# Patient Record
Sex: FEMALE | URBAN - METROPOLITAN AREA
[De-identification: names, ages, dates, MRNs, and addresses within clinical notes are randomized per-mention and may not be internally consistent; named-entity substitution may affect disease eponyms.]

---

## 2017-09-08 ENCOUNTER — HOSPITAL ENCOUNTER (OUTPATIENT)
Age: 72
Setting detail: SPECIMEN
Discharge: HOME | End: 2017-09-08
Payer: COMMERCIAL

## 2017-09-13 LAB — SURGICAL PATHOLOGY REPORT: NORMAL

## 2018-05-18 ENCOUNTER — HOSPITAL ENCOUNTER (OUTPATIENT)
Age: 73
Setting detail: SPECIMEN
Discharge: HOME | End: 2018-05-18
Payer: COMMERCIAL

## 2018-05-23 LAB — SURGICAL PATHOLOGY REPORT: NORMAL

## 2019-09-09 ENCOUNTER — HOSPITAL ENCOUNTER (OUTPATIENT)
Age: 74
Setting detail: SPECIMEN
Discharge: HOME | End: 2019-09-09
Payer: COMMERCIAL

## 2019-09-13 LAB — SURGICAL PATHOLOGY REPORT: NORMAL

## 2020-12-08 ENCOUNTER — TELEPHONE (OUTPATIENT)
Dept: PRIMARY CARE CLINIC | Age: 75
End: 2020-12-08

## 2020-12-09 RX ORDER — TIZANIDINE 2 MG/1
2 TABLET ORAL 3 TIMES DAILY PRN
Refills: 3 | COMMUNITY
Start: 2020-10-21 | End: 2020-12-09 | Stop reason: SDUPTHER

## 2020-12-09 RX ORDER — TIZANIDINE 2 MG/1
2 TABLET ORAL 3 TIMES DAILY PRN
Qty: 90 TABLET | Refills: 1 | Status: SHIPPED | OUTPATIENT
Start: 2020-12-09 | End: 2021-06-01

## 2021-02-18 RX ORDER — DULOXETIN HYDROCHLORIDE 30 MG/1
CAPSULE, DELAYED RELEASE ORAL
COMMUNITY
Start: 2020-11-17 | End: 2021-02-18 | Stop reason: SDUPTHER

## 2021-02-19 RX ORDER — DULOXETIN HYDROCHLORIDE 30 MG/1
30 CAPSULE, DELAYED RELEASE ORAL 2 TIMES DAILY
Qty: 180 CAPSULE | Refills: 3 | Status: SHIPPED | OUTPATIENT
Start: 2021-02-19 | End: 2021-10-14 | Stop reason: SDUPTHER

## 2021-04-13 RX ORDER — SPIRONOLACTONE 100 MG/1
TABLET, FILM COATED ORAL
COMMUNITY
Start: 2021-01-14 | End: 2021-04-13 | Stop reason: SDUPTHER

## 2021-04-14 RX ORDER — SPIRONOLACTONE 100 MG/1
100 TABLET, FILM COATED ORAL DAILY
Qty: 90 TABLET | Refills: 0 | Status: SHIPPED | OUTPATIENT
Start: 2021-04-14 | End: 2021-06-28

## 2021-04-26 ENCOUNTER — NURSE TRIAGE (OUTPATIENT)
Dept: OTHER | Facility: CLINIC | Age: 76
End: 2021-04-26

## 2021-04-26 NOTE — TELEPHONE ENCOUNTER
Reason for Disposition   SEVERE sinus pain    Answer Assessment - Initial Assessment Questions  1. LOCATION: \"Where does it hurt? \"       Sinuses    2. ONSET: \"When did the sinus pain start? \"  (e.g., hours, days)       5 days    3. SEVERITY: \"How bad is the pain? \"   (Scale 1-10; mild, moderate or severe)    - MILD (1-3): doesn't interfere with normal activities     - MODERATE (4-7): interferes with normal activities (e.g., work or school) or awakens from sleep    - SEVERE (8-10): excruciating pain and patient unable to do any normal activities         Moderate    4. RECURRENT SYMPTOM: \"Have you ever had sinus problems before? \" If so, ask: \"When was the last time? \" and \"What happened that time? \"       NA    5. NASAL CONGESTION: \"Is the nose blocked? \" If so, ask, \"Can you open it or must you breathe through the mouth? \"      Denies    6. NASAL DISCHARGE: \"Do you have discharge from your nose? \" If so ask, \"What color? \"      Green mucous    7. FEVER: \"Do you have a fever? \" If so, ask: \"What is it, how was it measured, and when did it start? \"       Temp of 100.2    8. OTHER SYMPTOMS: \"Do you have any other symptoms? \" (e.g., sore throat, cough, earache, difficulty breathing)      Had bottom teeth removed last Tuesday. In pain from that    9. PREGNANCY: \"Is there any chance you are pregnant? \" \"When was your last menstrual period? \"      NA    Protocols used: SINUS PAIN OR CONGESTION-ADULT-[place]    Received call from [facility] at pre-service center [facility]) [place] with [employer] Complaint. Brief description of triage: Probable sinus infection. Sinus pain and pressure with green mucous. Triage indicates for patient to be seen today. Caller is asking PCP to call in an antibiotic to [address] [place] [address] in [facility]. If caller needs a visit to office please advise patient. Caller does report she has a new patient appointment set up for 5/3.      Care advice provided, patient verbalizes understanding; denies any other questions or concerns; instructed to call back for any new or worsening symptoms. Attention Provider: Thank you for allowing me to participate in the care of your patient. The patient was connected to triage in response to information provided to the ECC. Please do not respond through this encounter as the response is not directed to a shared pool.

## 2021-04-26 NOTE — TELEPHONE ENCOUNTER
Talked to patient, She stated she has all of her bottom teeth pulled last week. She called them and they sent in a antibiotic for her because they were thinking it could be due to a infection in her mouth.

## 2021-06-01 RX ORDER — TIZANIDINE 2 MG/1
TABLET ORAL
Qty: 90 TABLET | Refills: 0 | Status: SHIPPED | OUTPATIENT
Start: 2021-06-01 | End: 2021-06-01 | Stop reason: SDUPTHER

## 2021-06-01 RX ORDER — ATENOLOL 50 MG/1
1 TABLET ORAL DAILY
COMMUNITY
Start: 2021-02-27 | End: 2021-06-01 | Stop reason: SDUPTHER

## 2021-06-02 RX ORDER — TIZANIDINE 2 MG/1
TABLET ORAL
Qty: 90 TABLET | Refills: 0 | Status: SHIPPED | OUTPATIENT
Start: 2021-06-02 | End: 2021-08-23

## 2021-06-02 RX ORDER — ATENOLOL 50 MG/1
50 TABLET ORAL DAILY
Qty: 30 TABLET | Refills: 0 | Status: SHIPPED | OUTPATIENT
Start: 2021-06-02 | End: 2021-07-06

## 2021-06-11 ENCOUNTER — NURSE TRIAGE (OUTPATIENT)
Dept: OTHER | Facility: CLINIC | Age: 76
End: 2021-06-11

## 2021-06-11 NOTE — TELEPHONE ENCOUNTER
Caller dropped during transfer from [facility] ([facility]). 1 attempt to reach call made to mobile number. Staff left a message that I will call again. Answer Assessment - Initial Assessment Questions  1. REASON FOR CALL or QUESTION: \"What is your reason for calling today? \" or \"How can I best  help you? \" or \"What question do you have that I can help answer? \"      Call dropped during transfer from ECC agent. Staff will try calling again    2. CALLER: Document the source of call. (e.g., laboratory, patient).       [facility]    Protocols used: PCP CALL - NO TRIAGE-ADULT-

## 2021-06-11 NOTE — TELEPHONE ENCOUNTER
Caller reached on second attempt. She stated that she was trying to cancel her appointment on Today, 6/11/21 at 1200. Staff called the [address] [place] [address] to report the request and contact the PCP to cancel this appointment.

## 2021-06-21 ENCOUNTER — TELEPHONE (OUTPATIENT)
Dept: PRIMARY CARE CLINIC | Age: 76
End: 2021-06-21

## 2021-06-21 NOTE — TELEPHONE ENCOUNTER
----- Message from Angel Wallis sent at 6/21/2021  9:15 AM EDT -----  Subject: Refill Request    QUESTIONS  Name of Medication? Other - medrol dose pack  Patient-reported dosage and instructions? Takes when she has hurt her knee  How many days do you have left? 0  Preferred Pharmacy? [facility] phone number (if available)? [phone]  Additional Information for Provider? PT needs the Medrol Dose Pack to to   treat knee pain after injury. ---------------------------------------------------------------------------  --------------  Moe LEUNG  What is the best way for the office to contact you? OK to leave message on   voicemail  Preferred Call Back Phone Number?  [phone]

## 2021-06-22 ENCOUNTER — OFFICE VISIT (OUTPATIENT)
Dept: PRIMARY CARE CLINIC | Age: 76
End: 2021-06-22
Payer: COMMERCIAL

## 2021-06-22 VITALS
DIASTOLIC BLOOD PRESSURE: 80 MMHG | HEIGHT: 59 IN | BODY MASS INDEX: 34.07 KG/M2 | WEIGHT: 169 LBS | HEART RATE: 65 BPM | OXYGEN SATURATION: 98 % | SYSTOLIC BLOOD PRESSURE: 130 MMHG

## 2021-06-22 DIAGNOSIS — M81.0 AGE-RELATED OSTEOPOROSIS WITHOUT CURRENT PATHOLOGICAL FRACTURE: ICD-10-CM

## 2021-06-22 DIAGNOSIS — Z12.31 BREAST CANCER SCREENING BY MAMMOGRAM: Primary | ICD-10-CM

## 2021-06-22 DIAGNOSIS — M25.562 ACUTE PAIN OF LEFT KNEE: ICD-10-CM

## 2021-06-22 PROCEDURE — 99213 OFFICE O/P EST LOW 20 MIN: CPT | Performed by: FAMILY MEDICINE

## 2021-06-22 RX ORDER — TRAMADOL HYDROCHLORIDE 50 MG/1
50 TABLET ORAL EVERY 6 HOURS PRN
COMMUNITY

## 2021-06-22 RX ORDER — METHYLPREDNISOLONE 4 MG/1
TABLET ORAL
Qty: 1 KIT | Refills: 1 | Status: SHIPPED | OUTPATIENT
Start: 2021-06-22 | End: 2021-06-28

## 2021-06-22 RX ORDER — DICYCLOMINE HYDROCHLORIDE 10 MG/1
10 CAPSULE ORAL
COMMUNITY
End: 2021-07-21 | Stop reason: SDUPTHER

## 2021-06-22 RX ORDER — LEVOTHYROXINE SODIUM 0.12 MG/1
125 TABLET ORAL DAILY
COMMUNITY
End: 2021-09-17 | Stop reason: SDUPTHER

## 2021-06-22 RX ORDER — TRAZODONE HYDROCHLORIDE 100 MG/1
100 TABLET ORAL NIGHTLY
COMMUNITY
End: 2022-01-13 | Stop reason: SDUPTHER

## 2021-06-22 SDOH — ECONOMIC STABILITY: FOOD INSECURITY: WITHIN THE PAST 12 MONTHS, YOU WORRIED THAT YOUR FOOD WOULD RUN OUT BEFORE YOU GOT MONEY TO BUY MORE.: NEVER TRUE

## 2021-06-22 SDOH — ECONOMIC STABILITY: FOOD INSECURITY: WITHIN THE PAST 12 MONTHS, THE FOOD YOU BOUGHT JUST DIDN'T LAST AND YOU DIDN'T HAVE MONEY TO GET MORE.: NEVER TRUE

## 2021-06-22 ASSESSMENT — SOCIAL DETERMINANTS OF HEALTH (SDOH): HOW HARD IS IT FOR YOU TO PAY FOR THE VERY BASICS LIKE FOOD, HOUSING, MEDICAL CARE, AND HEATING?: NOT HARD AT ALL

## 2021-06-22 ASSESSMENT — PATIENT HEALTH QUESTIONNAIRE - PHQ9
SUM OF ALL RESPONSES TO PHQ9 QUESTIONS 1 & 2: 0
1. LITTLE INTEREST OR PLEASURE IN DOING THINGS: 0
2. FEELING DOWN, DEPRESSED OR HOPELESS: 0
SUM OF ALL RESPONSES TO PHQ QUESTIONS 1-9: 0

## 2021-06-22 NOTE — PROGRESS NOTES
Subjective:      Patient ID: Rashi Edge is a 78 y.o. female. Got tangled up in changing bed covers and twisted knee  Happens once or twice a year, has a replacement and ortho has evaluated  A dose berenice works      Review of Systems   Constitutional: Negative. Musculoskeletal: Positive for joint swelling. All other systems reviewed and are negative. Objective:   Physical Exam  Musculoskeletal:         General: Swelling, tenderness and signs of injury present. Neurological:      General: No focal deficit present. Psychiatric:         Mood and Affect: Mood normal.         Thought Content: Thought content normal.         Assessment:      1. Breast cancer screening by mammogram    2. Age-related osteoporosis without current pathological fracture            Plan:      Estefania Umanzor was seen today for other. Diagnoses and all orders for this visit:    Breast cancer screening by mammogram  -     LALO DIGITAL DIAGNOSTIC W OR WO CAD BILATERAL; Future    Age-related osteoporosis without current pathological fracture  -     DEXA BONE DENSITY 2 SITES; Future    Other orders  -     methylPREDNISolone (MEDROL DOSEPACK) 4 MG tablet; Take by mouth.                 Electronically signed by Tre Smiley MD on 6/22/2021 at 11:41 AM

## 2021-06-28 RX ORDER — SPIRONOLACTONE 100 MG/1
TABLET, FILM COATED ORAL
Qty: 90 TABLET | Refills: 0 | Status: SHIPPED | OUTPATIENT
Start: 2021-06-28 | End: 2021-07-14

## 2021-07-06 RX ORDER — ATENOLOL 50 MG/1
TABLET ORAL
Qty: 30 TABLET | Refills: 0 | Status: SHIPPED | OUTPATIENT
Start: 2021-07-06 | End: 2021-08-02

## 2021-07-08 ENCOUNTER — TELEPHONE (OUTPATIENT)
Dept: PRIMARY CARE CLINIC | Age: 76
End: 2021-07-08

## 2021-07-08 NOTE — TELEPHONE ENCOUNTER
Call to ask for more information. She is nto sure if she got bit by a tick or not. She felt a tickle in her ear and discovered it was a tick. Now she is having ankle, leg,and hand pain. She is going to call if she wants an appt.

## 2021-07-08 NOTE — TELEPHONE ENCOUNTER
----- Message from Jacquelyn Platt sent at 7/8/2021  9:42 AM EDT -----  Subject: Message to Provider    QUESTIONS  Information for Provider? Pt. thinks she has a bite from a tick, was in   her ear and was crawling on neck and shoulder, her hand is all swollen and   cant bend her thumb and other places in her body. Pt. can't put weight on   and can't bend left foot as it feels sprained. Pt. would like a call to   discuss if she needs an appt.   ---------------------------------------------------------------------------  --------------  CALL BACK INFO  What is the best way for the office to contact you? OK to leave message on   voicemail  Preferred Call Back Phone Number? [phone]  ---------------------------------------------------------------------------  --------------  SCRIPT ANSWERS  Relationship to Patient?  Self

## 2021-07-12 ENCOUNTER — TELEPHONE (OUTPATIENT)
Dept: PRIMARY CARE CLINIC | Age: 76
End: 2021-07-12

## 2021-07-12 DIAGNOSIS — R53.81 MALAISE: Primary | ICD-10-CM

## 2021-07-12 NOTE — TELEPHONE ENCOUNTER
Called pt to ask for clarification on her call earlier. ECC left message to office about pts blood orders. I did not see any orders from Dr. Naldo Amin. Left message for pt to call back for clarification.

## 2021-07-13 ENCOUNTER — TELEPHONE (OUTPATIENT)
Dept: PRIMARY CARE CLINIC | Age: 76
End: 2021-07-13

## 2021-07-13 NOTE — TELEPHONE ENCOUNTER
----- Message from Mj Bazzi sent at 7/12/2021 11:24 AM EDT -----  Subject: Message to Provider    QUESTIONS  Information for Provider? Patient would like blood orders work called in.  ---------------------------------------------------------------------------  --------------  [address]  What is the best way for the office to contact you? OK to leave message on   voicemail  Preferred Call Back Phone Number? [phone]  ---------------------------------------------------------------------------  --------------  SCRIPT ANSWERS  Relationship to Patient?  Self

## 2021-07-14 ENCOUNTER — OFFICE VISIT (OUTPATIENT)
Dept: PRIMARY CARE CLINIC | Age: 76
End: 2021-07-14
Payer: COMMERCIAL

## 2021-07-14 VITALS
WEIGHT: 173 LBS | OXYGEN SATURATION: 98 % | SYSTOLIC BLOOD PRESSURE: 120 MMHG | HEART RATE: 69 BPM | DIASTOLIC BLOOD PRESSURE: 75 MMHG | HEIGHT: 59 IN | BODY MASS INDEX: 34.88 KG/M2
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
## 2021-07-14 DIAGNOSIS — M25.50 ARTHRALGIA, UNSPECIFIED JOINT: ICD-10-CM

## 2021-07-14 PROCEDURE — 99213 OFFICE O/P EST LOW 20 MIN: CPT | Performed by: FAMILY MEDICINE

## 2021-07-14 RX ORDER — SPIRONOLACTONE 25 MG
1 TABLET ORAL DAILY
COMMUNITY

## 2021-07-14 RX ORDER — POLYETHYLENE GLYCOL 3350 17 G/17G
POWDER, FOR SOLUTION ORAL DAILY PRN
COMMUNITY

## 2021-07-14 RX ORDER — NAPROXEN 500 MG/1
TABLET ORAL
COMMUNITY
Start: 2021-05-17 | End: 2021-08-02 | Stop reason: SDUPTHER

## 2021-07-14 RX ORDER — TIZANIDINE 2 MG/1
2 TABLET ORAL DAILY
COMMUNITY
End: 2021-08-23

## 2021-07-14 RX ORDER — METHYLPREDNISOLONE 4 MG/1
TABLET ORAL
Qty: 1 KIT | Refills: 0 | Status: SHIPPED | OUTPATIENT
Start: 2021-07-14 | End: 2021-07-20

## 2021-07-14 RX ORDER — SPIRONOLACTONE 25 MG/1
100 TABLET ORAL DAILY
COMMUNITY
End: 2021-09-09

## 2021-07-14 RX ORDER — PSEUDOEPHEDRINE HCL 30 MG
100 TABLET ORAL 3 TIMES DAILY PRN
COMMUNITY

## 2021-07-14 RX ORDER — ACETAMINOPHEN 500 MG
1000 TABLET ORAL 3 TIMES DAILY
COMMUNITY

## 2021-07-14 NOTE — PROGRESS NOTES
Subjective:      Patient ID: Stefan Seth is a 78 y.o. female. Found a tick in her right ear and got bit by ants then had an acute arthritic flair    Other        Review of Systems   Constitutional: Negative. All other systems reviewed and are negative. Objective:   Physical Exam  Constitutional:       Appearance: Normal appearance. Musculoskeletal:         General: Swelling present. Skin:     General: Skin is warm and dry. Neurological:      General: No focal deficit present. Mental Status: She is alert. Psychiatric:         Mood and Affect: Mood normal.         Thought Content: Thought content normal.         Assessment:      No diagnosis found. Plan:      There are no diagnoses linked to this encounter.   definiely a tick with mouthparts intact no visible ant bites but multiple joint discomforts  Medrol dose berenice        Electronically signed by Sumi Acosta MD on 7/14/2021 at 2:55 PM

## 2021-07-15 ENCOUNTER — TELEPHONE (OUTPATIENT)
Dept: PRIMARY CARE CLINIC | Age: 76
End: 2021-07-15

## 2021-07-15 NOTE — TELEPHONE ENCOUNTER
----- Message from Areli Hou sent at 7/15/2021  2:06 PM EDT -----  Subject: Message to Provider    QUESTIONS  Information for Provider? Patient needs to cancel her mobile mammogram   appointment tomorrow 7/16/21 @ 14:40. due to lack of transportation Unable   to cancel on my end. Thanks  ---------------------------------------------------------------------------  --------------  Aldo Portillo INFO  What is the best way for the office to contact you? OK to leave message on   voicemail  Preferred Call Back Phone Number? [phone]  ---------------------------------------------------------------------------  --------------  SCRIPT ANSWERS  Relationship to Patient?  Self

## 2021-07-21 RX ORDER — DICYCLOMINE HYDROCHLORIDE 10 MG/1
10 CAPSULE ORAL
Qty: 120 CAPSULE | Refills: 1 | Status: SHIPPED | OUTPATIENT
Start: 2021-07-21 | End: 2021-09-17 | Stop reason: SDUPTHER

## 2021-07-22 DIAGNOSIS — R53.81 MALAISE: ICD-10-CM

## 2021-07-28 LAB
BASOPHILS ABSOLUTE: 0 /ΜL
BASOPHILS RELATIVE PERCENT: 0.7 %
EOSINOPHILS ABSOLUTE: 0.2 /ΜL
EOSINOPHILS RELATIVE PERCENT: 4 %
HCT VFR BLD CALC: 40.2 % (ref 36–46)
HEMOGLOBIN: 12.6 G/DL (ref 12–16)
LYMPHOCYTES ABSOLUTE: 1.8 /ΜL
LYMPHOCYTES RELATIVE PERCENT: 31.9 %
MCH RBC QN AUTO: 29.4 PG
MCHC RBC AUTO-ENTMCNC: 31.3 G/DL
MCV RBC AUTO: 93.9 FL
MONOCYTES ABSOLUTE: 0.4 /ΜL
MONOCYTES RELATIVE PERCENT: 7.9 %
NEUTROPHILS ABSOLUTE: 3.1 /ΜL
NEUTROPHILS RELATIVE PERCENT: 55.1 %
PDW BLD-RTO: 13.1 %
PLATELET # BLD: 228 K/ΜL
PMV BLD AUTO: NORMAL FL
RBC # BLD: 4.28 10^6/ΜL
WBC # BLD: 5.55 10^3/ML

## 2021-07-29 ENCOUNTER — TELEPHONE (OUTPATIENT)
Dept: PRIMARY CARE CLINIC | Age: 76
End: 2021-07-29

## 2021-08-02 RX ORDER — ATENOLOL 50 MG/1
TABLET ORAL
Qty: 30 TABLET | Refills: 0 | Status: SHIPPED | OUTPATIENT
Start: 2021-08-02 | End: 2021-08-30

## 2021-08-03 RX ORDER — NAPROXEN 500 MG/1
500 TABLET ORAL 2 TIMES DAILY WITH MEALS
Qty: 60 TABLET | Refills: 5 | Status: SHIPPED | OUTPATIENT
Start: 2021-08-03 | End: 2021-09-17 | Stop reason: SDUPTHER

## 2021-08-23 RX ORDER — TIZANIDINE 2 MG/1
TABLET ORAL
Qty: 90 TABLET | Refills: 0 | Status: SHIPPED | OUTPATIENT
Start: 2021-08-23 | End: 2021-09-17 | Stop reason: SDUPTHER

## 2021-08-30 RX ORDER — ATENOLOL 50 MG/1
TABLET ORAL
Qty: 30 TABLET | Refills: 0 | Status: SHIPPED | OUTPATIENT
Start: 2021-08-30 | End: 2021-09-17 | Stop reason: SDUPTHER

## 2021-09-09 RX ORDER — SPIRONOLACTONE 100 MG/1
TABLET, FILM COATED ORAL
Qty: 90 TABLET | Refills: 0 | Status: SHIPPED | OUTPATIENT
Start: 2021-09-09 | End: 2021-09-23 | Stop reason: SDUPTHER

## 2021-09-15 ENCOUNTER — TELEPHONE (OUTPATIENT)
Dept: PRIMARY CARE CLINIC | Age: 76
End: 2021-09-15

## 2021-09-17 NOTE — TELEPHONE ENCOUNTER
Blaze Barnard calling needing her levothyroxine    She also needs 90 days of her other pended mediation. Dr Yaneth Swenson is out of the office but his ma reached out to him, he said he will call that in on Monday when he returns to the office.

## 2021-09-17 NOTE — TELEPHONE ENCOUNTER
Pt is out of levothyroxine and needs refills;    Pt also wondering if she can have 90 day supply for her tizanidine, atenolol, naproxen, dicyclomine

## 2021-09-20 ENCOUNTER — TELEPHONE (OUTPATIENT)
Dept: PRIMARY CARE CLINIC | Age: 76
End: 2021-09-20

## 2021-09-20 RX ORDER — LEVOTHYROXINE SODIUM 0.12 MG/1
125 TABLET ORAL DAILY
Qty: 30 TABLET | Refills: 2 | Status: SHIPPED | OUTPATIENT
Start: 2021-09-20 | End: 2021-09-24 | Stop reason: SDUPTHER

## 2021-09-20 RX ORDER — TIZANIDINE 2 MG/1
TABLET ORAL
Qty: 90 TABLET | Refills: 3 | Status: SHIPPED | OUTPATIENT
Start: 2021-09-20 | End: 2021-09-24 | Stop reason: SDUPTHER

## 2021-09-20 RX ORDER — ATENOLOL 50 MG/1
TABLET ORAL
Qty: 30 TABLET | Refills: 5 | Status: SHIPPED | OUTPATIENT
Start: 2021-09-20 | End: 2021-09-24

## 2021-09-20 RX ORDER — DICYCLOMINE HYDROCHLORIDE 10 MG/1
10 CAPSULE ORAL
Qty: 120 CAPSULE | Refills: 1 | Status: SHIPPED | OUTPATIENT
Start: 2021-09-20 | End: 2021-09-24 | Stop reason: SDUPTHER

## 2021-09-20 RX ORDER — NAPROXEN 500 MG/1
500 TABLET ORAL 2 TIMES DAILY WITH MEALS
Qty: 60 TABLET | Refills: 5 | Status: SHIPPED | OUTPATIENT
Start: 2021-09-20 | End: 2021-09-24 | Stop reason: SDUPTHER

## 2021-09-22 ENCOUNTER — TELEPHONE (OUTPATIENT)
Dept: PRIMARY CARE CLINIC | Age: 76
End: 2021-09-22

## 2021-09-22 NOTE — TELEPHONE ENCOUNTER
pt is calling in regards to her meds was not taken care of per [facility] viraj. .. she want a 90 day supply instead of 30 day supply and would like a call back from o

## 2021-09-24 RX ORDER — LEVOTHYROXINE SODIUM 0.12 MG/1
125 TABLET ORAL DAILY
Qty: 30 TABLET | Refills: 2 | Status: SHIPPED | OUTPATIENT
Start: 2021-09-24 | End: 2021-09-24 | Stop reason: SDUPTHER

## 2021-09-24 RX ORDER — TIZANIDINE 2 MG/1
TABLET ORAL
Qty: 90 TABLET | Refills: 3 | Status: SHIPPED | OUTPATIENT
Start: 2021-09-24 | End: 2021-10-14 | Stop reason: SDUPTHER

## 2021-09-24 RX ORDER — ATENOLOL 50 MG/1
TABLET ORAL
Qty: 30 TABLET | Refills: 0 | Status: SHIPPED | OUTPATIENT
Start: 2021-09-24 | End: 2021-09-24

## 2021-09-24 RX ORDER — LEVOTHYROXINE SODIUM 0.12 MG/1
125 TABLET ORAL DAILY
Qty: 30 TABLET | Refills: 2 | Status: SHIPPED | OUTPATIENT
Start: 2021-09-24 | End: 2021-10-06 | Stop reason: SDUPTHER

## 2021-09-24 RX ORDER — NAPROXEN 500 MG/1
500 TABLET ORAL 2 TIMES DAILY WITH MEALS
Qty: 60 TABLET | Refills: 5 | Status: SHIPPED | OUTPATIENT
Start: 2021-09-24 | End: 2021-10-14 | Stop reason: SDUPTHER

## 2021-09-24 RX ORDER — ATENOLOL 50 MG/1
TABLET ORAL
Qty: 30 TABLET | Refills: 5 | Status: SHIPPED | OUTPATIENT
Start: 2021-09-24 | End: 2021-10-06 | Stop reason: SDUPTHER

## 2021-09-24 RX ORDER — DICYCLOMINE HYDROCHLORIDE 10 MG/1
10 CAPSULE ORAL
Qty: 120 CAPSULE | Refills: 1 | Status: SHIPPED | OUTPATIENT
Start: 2021-09-24 | End: 2021-10-06 | Stop reason: SDUPTHER

## 2021-09-24 RX ORDER — SPIRONOLACTONE 100 MG/1
TABLET, FILM COATED ORAL
Qty: 90 TABLET | Refills: 1 | Status: SHIPPED | OUTPATIENT
Start: 2021-09-24 | End: 2021-10-14 | Stop reason: SDUPTHER

## 2021-09-30 ENCOUNTER — TELEPHONE (OUTPATIENT)
Dept: PRIMARY CARE CLINIC | Age: 76
End: 2021-09-30

## 2021-09-30 NOTE — TELEPHONE ENCOUNTER
[place] called to see if the meds the patient is on can be written for 90 days. I told him there are refills on them that would equal 90 days. He is going to ask the pharmacy mail order if they are ok with doing the refills instead of complete 90 days.

## 2021-10-06 RX ORDER — LEVOTHYROXINE SODIUM 0.12 MG/1
125 TABLET ORAL DAILY
Qty: 30 TABLET | Refills: 2 | Status: SHIPPED | OUTPATIENT
Start: 2021-10-06 | End: 2021-10-14 | Stop reason: SDUPTHER

## 2021-10-06 RX ORDER — DICYCLOMINE HYDROCHLORIDE 10 MG/1
10 CAPSULE ORAL
Qty: 120 CAPSULE | Refills: 1 | Status: SHIPPED | OUTPATIENT
Start: 2021-10-06 | End: 2021-10-14 | Stop reason: SDUPTHER

## 2021-10-06 RX ORDER — ATENOLOL 50 MG/1
TABLET ORAL
Qty: 30 TABLET | Refills: 5 | Status: SHIPPED | OUTPATIENT
Start: 2021-10-06 | End: 2021-10-14 | Stop reason: SDUPTHER

## 2021-10-14 ENCOUNTER — TELEPHONE (OUTPATIENT)
Dept: PRIMARY CARE CLINIC | Age: 76
End: 2021-10-14

## 2021-10-14 RX ORDER — DULOXETIN HYDROCHLORIDE 30 MG/1
30 CAPSULE, DELAYED RELEASE ORAL 2 TIMES DAILY
Qty: 180 CAPSULE | Refills: 1 | Status: SHIPPED | OUTPATIENT
Start: 2021-10-14 | End: 2022-01-19 | Stop reason: SDUPTHER

## 2021-10-14 RX ORDER — SPIRONOLACTONE 100 MG/1
100 TABLET, FILM COATED ORAL DAILY
Qty: 90 TABLET | Refills: 1 | Status: SHIPPED | OUTPATIENT
Start: 2021-10-14 | End: 2022-01-13 | Stop reason: SDUPTHER

## 2021-10-14 RX ORDER — NAPROXEN 500 MG/1
500 TABLET ORAL 2 TIMES DAILY WITH MEALS
Qty: 180 TABLET | Refills: 1 | Status: SHIPPED | OUTPATIENT
Start: 2021-10-14 | End: 2022-01-13 | Stop reason: SDUPTHER

## 2021-10-14 RX ORDER — ATENOLOL 50 MG/1
50 TABLET ORAL DAILY
Qty: 90 TABLET | Refills: 1 | Status: SHIPPED | OUTPATIENT
Start: 2021-10-14 | End: 2022-01-13 | Stop reason: SDUPTHER

## 2021-10-14 RX ORDER — LEVOTHYROXINE SODIUM 0.12 MG/1
125 TABLET ORAL DAILY
Qty: 90 TABLET | Refills: 1 | Status: SHIPPED | OUTPATIENT
Start: 2021-10-14 | End: 2022-01-13 | Stop reason: SDUPTHER

## 2021-10-14 RX ORDER — DICYCLOMINE HYDROCHLORIDE 10 MG/1
10 CAPSULE ORAL
Qty: 360 CAPSULE | Refills: 1 | Status: SHIPPED | OUTPATIENT
Start: 2021-10-14 | End: 2022-01-13 | Stop reason: SDUPTHER

## 2021-10-14 RX ORDER — TIZANIDINE 2 MG/1
TABLET ORAL
Qty: 270 TABLET | Refills: 1 | Status: SHIPPED | OUTPATIENT
Start: 2021-10-14 | End: 2022-01-13 | Stop reason: SDUPTHER

## 2021-10-22 ENCOUNTER — TELEPHONE (OUTPATIENT)
Dept: PRIMARY CARE CLINIC | Age: 76
End: 2021-10-22

## 2021-10-22 NOTE — TELEPHONE ENCOUNTER
NOHEMY CALLING ASKING IF WE COULD SEND HER DEXA AND MAMM ORDERS OVER TO [facility].  SHE PROVIDED A FAX NUMBER OF: [phone]    I FAXED THE ORDERS OVER

## 2022-01-07 NOTE — TELEPHONE ENCOUNTER
Patient changed insurances so her pharmacy has changed. No longer will be with [facility] mail order. New pharmacy will be sending over requests for patient.  She does not know the name of new pharmacy but number is [phone]

## 2022-01-13 RX ORDER — DICYCLOMINE HYDROCHLORIDE 10 MG/1
10 CAPSULE ORAL
Qty: 360 CAPSULE | Refills: 1 | Status: SHIPPED | OUTPATIENT
Start: 2022-01-13 | End: 2022-01-19 | Stop reason: SDUPTHER

## 2022-01-13 RX ORDER — SPIRONOLACTONE 100 MG/1
100 TABLET, FILM COATED ORAL DAILY
Qty: 90 TABLET | Refills: 1 | Status: SHIPPED | OUTPATIENT
Start: 2022-01-13 | End: 2022-01-19 | Stop reason: SDUPTHER

## 2022-01-13 RX ORDER — ATENOLOL 50 MG/1
50 TABLET ORAL DAILY
Qty: 90 TABLET | Refills: 1 | Status: SHIPPED | OUTPATIENT
Start: 2022-01-13 | End: 2022-01-19 | Stop reason: SDUPTHER

## 2022-01-13 RX ORDER — TRAZODONE HYDROCHLORIDE 100 MG/1
100 TABLET ORAL NIGHTLY
Qty: 90 TABLET | Refills: 1 | Status: SHIPPED | OUTPATIENT
Start: 2022-01-13 | End: 2022-01-19 | Stop reason: SDUPTHER

## 2022-01-13 RX ORDER — TIZANIDINE 2 MG/1
TABLET ORAL
Qty: 270 TABLET | Refills: 1 | Status: SHIPPED | OUTPATIENT
Start: 2022-01-13 | End: 2022-01-19 | Stop reason: SDUPTHER

## 2022-01-13 RX ORDER — NAPROXEN 500 MG/1
500 TABLET ORAL 2 TIMES DAILY WITH MEALS
Qty: 180 TABLET | Refills: 1 | Status: SHIPPED | OUTPATIENT
Start: 2022-01-13 | End: 2022-01-19 | Stop reason: SDUPTHER

## 2022-01-13 RX ORDER — LEVOTHYROXINE SODIUM 0.12 MG/1
125 TABLET ORAL DAILY
Qty: 90 TABLET | Refills: 1 | Status: SHIPPED | OUTPATIENT
Start: 2022-01-13 | End: 2022-01-19 | Stop reason: SDUPTHER

## 2022-01-18 NOTE — TELEPHONE ENCOUNTER
Patient states [facility] should [facility]. She is now close to being out of meds.  Please [facility]end [facility] that were [facility]

## 2022-01-19 RX ORDER — NAPROXEN 500 MG/1
500 TABLET ORAL 2 TIMES DAILY WITH MEALS
Qty: 180 TABLET | Refills: 1 | Status: SHIPPED | OUTPATIENT
Start: 2022-01-19 | End: 2022-02-18

## 2022-01-19 RX ORDER — TRAZODONE HYDROCHLORIDE 100 MG/1
100 TABLET ORAL NIGHTLY
Qty: 90 TABLET | Refills: 1 | Status: SHIPPED | OUTPATIENT
Start: 2022-01-19

## 2022-01-19 RX ORDER — ATENOLOL 50 MG/1
50 TABLET ORAL DAILY
Qty: 90 TABLET | Refills: 1 | Status: SHIPPED | OUTPATIENT
Start: 2022-01-19

## 2022-01-19 RX ORDER — SPIRONOLACTONE 100 MG/1
100 TABLET, FILM COATED ORAL DAILY
Qty: 90 TABLET | Refills: 1 | Status: SHIPPED | OUTPATIENT
Start: 2022-01-19

## 2022-01-19 RX ORDER — LEVOTHYROXINE SODIUM 0.12 MG/1
125 TABLET ORAL DAILY
Qty: 90 TABLET | Refills: 1 | Status: SHIPPED | OUTPATIENT
Start: 2022-01-19

## 2022-01-19 RX ORDER — DULOXETIN HYDROCHLORIDE 30 MG/1
30 CAPSULE, DELAYED RELEASE ORAL 2 TIMES DAILY
Qty: 180 CAPSULE | Refills: 1 | Status: SHIPPED | OUTPATIENT
Start: 2022-01-19 | End: 2022-04-19

## 2022-01-19 RX ORDER — DICYCLOMINE HYDROCHLORIDE 10 MG/1
10 CAPSULE ORAL
Qty: 360 CAPSULE | Refills: 1 | Status: SHIPPED | OUTPATIENT
Start: 2022-01-19 | End: 2022-02-18

## 2022-01-19 RX ORDER — TIZANIDINE 2 MG/1
TABLET ORAL
Qty: 270 TABLET | Refills: 1 | Status: SHIPPED | OUTPATIENT
Start: 2022-01-19

## 2022-01-20 DIAGNOSIS — Z12.31 ENCOUNTER FOR SCREENING MAMMOGRAM FOR MALIGNANT NEOPLASM OF BREAST: Primary | ICD-10-CM

## 2022-05-24 RX ORDER — LEVOTHYROXINE SODIUM 0.12 MG/1
125 TABLET ORAL DAILY
Qty: 90 TABLET | Refills: 3 | OUTPATIENT
Start: 2022-05-24

## 2022-05-24 RX ORDER — ATENOLOL 50 MG/1
TABLET ORAL
Qty: 90 TABLET | Refills: 3 | OUTPATIENT
Start: 2022-05-24

## 2023-03-09 DIAGNOSIS — M81.0 SENILE OSTEOPOROSIS: Primary | ICD-10-CM

## 2023-03-09 RX ORDER — ONDANSETRON 2 MG/ML
8 INJECTION INTRAMUSCULAR; INTRAVENOUS
OUTPATIENT
Start: 2023-03-09

## 2023-03-09 RX ORDER — SODIUM CHLORIDE 9 MG/ML
INJECTION, SOLUTION INTRAVENOUS CONTINUOUS
OUTPATIENT
Start: 2023-03-09

## 2023-03-09 RX ORDER — DIPHENHYDRAMINE HYDROCHLORIDE 50 MG/ML
50 INJECTION INTRAMUSCULAR; INTRAVENOUS
OUTPATIENT
Start: 2023-03-09

## 2023-03-09 RX ORDER — EPINEPHRINE 1 MG/ML
0.3 INJECTION, SOLUTION, CONCENTRATE INTRAVENOUS PRN
OUTPATIENT
Start: 2023-03-09

## 2023-03-09 RX ORDER — ALBUTEROL SULFATE 90 UG/1
4 AEROSOL, METERED RESPIRATORY (INHALATION) PRN
OUTPATIENT
Start: 2023-03-09

## 2023-03-09 RX ORDER — ACETAMINOPHEN 325 MG/1
650 TABLET ORAL
OUTPATIENT
Start: 2023-03-09

## 2024-03-22 ENCOUNTER — TELEPHONE (OUTPATIENT)
Age: 79
End: 2024-03-22

## 2024-03-27 NOTE — TELEPHONE ENCOUNTER
Spoke with pt over phone. Patient last seen 9/2023 and MRI lumbar spine was ordered which has not been completed yet. Patient states she has been undergoing treatment for cancer on her face and hasn't been able to complete MRI.  Patient states within last couple weeks her back pain is worsening and she has been having increased numbness both legs. Pt states more so after she has been sleeping for 4-5 hrs. Pt states she has also been having some incontinence of urine after she stands from sitting or laying position.   I strongly advised to pt to get eval in ED with these s/sx and pt states she has been dealing with this for a while now and hopes not to go to ED if she doesn't have to. States d/t recent CA treatment the cost of an ED visit concerns her. I explained to pt we are booking out at least 2 weeks-she understands and is willing to wait. I did advise to pt that if new or worsening s/sx to go to ED and update our office-she understands/agrees.   Patient has order for lumbar MRI from 9/2023 visit in [facility]. Patient requests to go to [facility] for lumbar MRI.  Lumbar MRI order and [facility] and f/u appt with Dr Davis made for 4/10.  
koko.  CARON Lee RN

## 2024-04-01 ENCOUNTER — TELEPHONE (OUTPATIENT)
Age: 79
End: 2024-04-01

## 2024-04-01 NOTE — TELEPHONE ENCOUNTER
4/1/24 Updated Dr Davis - he states pt was told repeatedly to go to ER last week. She is aware that is best advice and it is her decision. CARON Lee RN    4/1/24 Pt called this  PM to let us know her lumbar MRI is now scheduled for 4/3 @ 11 am in [facility].  She had episode of bilateral leg numbness last PM when she woke up on her couch. It took her awhile to get to her walker. She had fecal incontinence too. I again encouraged her to go to ER for evaluation. I explained that the nerves to her B&B are fragile and can be permanently damaged if compressed. She states she has gotten in an argument w her kids about the ER.  She did not go last Wed on as instructed by myself per Dr Davis. I again encouraged her to go. I told her she can call 9-1-1 and get help that way. She went back to the fact she is getting her MRI done...  CARON Lee RN

## 2024-04-03 ENCOUNTER — HOSPITAL ENCOUNTER (OUTPATIENT)
Dept: MRI IMAGING | Age: 79
Discharge: HOME | End: 2024-04-05
Attending: NEUROLOGICAL SURGERY
Payer: MEDICARE

## 2024-04-03 DIAGNOSIS — M54.50 LUMBAR PAIN: ICD-10-CM

## 2024-04-03 PROCEDURE — 72148 MRI LUMBAR SPINE W/O DYE: CPT

## 2024-04-10 ENCOUNTER — OFFICE VISIT (OUTPATIENT)
Age: 79
End: 2024-04-10
Payer: MEDICARE

## 2024-04-10 VITALS — BODY MASS INDEX: 35.54 KG/M2 | WEIGHT: 176.3 LBS | HEIGHT: 59 IN

## 2024-04-10 DIAGNOSIS — R26.81 UNSTEADY GAIT: ICD-10-CM

## 2024-04-10 DIAGNOSIS — R20.0 NUMBNESS: Primary | ICD-10-CM

## 2024-04-10 PROCEDURE — 1123F ACP DISCUSS/DSCN MKR DOCD: CPT | Performed by: NEUROLOGICAL SURGERY

## 2024-04-10 PROCEDURE — 99213 OFFICE O/P EST LOW 20 MIN: CPT | Performed by: NEUROLOGICAL SURGERY

## 2024-04-10 RX ORDER — GABAPENTIN 300 MG/1
300 CAPSULE ORAL 3 TIMES DAILY
COMMUNITY

## 2024-04-10 NOTE — PROGRESS NOTES
[facility], [facility] NEUROSCIENCE [place], [facility] NEUROSURGERY  [address]  [place] [id number]  Dept: [phone]  Dept Fax: [phone]     Patient:  Elicia Antony  YOB: 1945  Date: 4/10/24      Chief Complaint   Patient presents with    Back Pain     Increased lower back pain, numbness, weakness, review lumbar MRI           HPI:     I had the pleasure of seeing this patient back in the office today in follow-up.  As you know she is a 78-year-old female with a longstanding history of chronic back pain.  She does have a fentanyl pump in place and has been through pain management without improvement.  The patient presents today with ongoing complaints of chronic back pain as well as complaints of diffuse body numbness.  She has noticed a degree of unsteadiness of gait as well.  Patient also did have a spinal cord stimulator for chronic pain which was unsuccessful and was removed.  She did undergo a recent lumbar MRI performed on April 5, 2024 which is reviewed.  This study does demonstrate extensive degenerative changes throughout the lumbar spine in addition to postoperative changes with instrumentation at the L3-L5 levels.  There was no significant central stenosis noted on the study.  No obvious focal point of nerve root impingement was noted on the left.  The spinal cord was visualized to the T10 level with no evidence of cord compression or intrinsic cord signal changes. I did review these images in the office today with the patient.  In reviewing the MRI images as stated above, I do not identify specific abnormality to which I feel surgical intervention would be indicated or of benefit.  Thus far the patient has worked extensively with pain management without improvement.  Given her complaints of diffuse numbness as well as unsteadiness of gait, we are referring her to neurology for formal evaluation.  I did discuss these

## 2024-04-30 ENCOUNTER — HOSPITAL ENCOUNTER (OUTPATIENT)
Age: 79
Setting detail: SPECIMEN
Discharge: HOME | End: 2024-04-30

## 2024-04-30 ENCOUNTER — OFFICE VISIT (OUTPATIENT)
Dept: NEUROLOGY | Age: 79
End: 2024-04-30
Payer: MEDICARE

## 2024-04-30 VITALS
HEART RATE: 55 BPM | BODY MASS INDEX: 35 KG/M2 | HEIGHT: 59 IN | SYSTOLIC BLOOD PRESSURE: 115 MMHG | DIASTOLIC BLOOD PRESSURE: 67 MMHG | WEIGHT: 173.6 LBS

## 2024-04-30 DIAGNOSIS — R20.0 ANESTHESIA OF SKIN: ICD-10-CM

## 2024-04-30 DIAGNOSIS — R29.898 LEFT ARM WEAKNESS: ICD-10-CM

## 2024-04-30 DIAGNOSIS — R26.89 IMBALANCE: ICD-10-CM

## 2024-04-30 DIAGNOSIS — M51.36 LUMBAR DEGENERATIVE DISC DISEASE: Primary | ICD-10-CM

## 2024-04-30 LAB
CRP SERPL HS-MCNC: <3 MG/L (ref 0–5)
ERYTHROCYTE [SEDIMENTATION RATE] IN BLOOD BY PHOTOMETRIC METHOD: 6 MM/HR (ref 0–30)
FOLATE SERPL-MCNC: >20 NG/ML (ref 4.8–24.2)
TSH SERPL DL<=0.05 MIU/L-ACNC: 0.08 UIU/ML (ref 0.27–4.2)
VIT B12 SERPL-MCNC: >2000 PG/ML (ref 232–1245)

## 2024-04-30 PROCEDURE — 99204 OFFICE O/P NEW MOD 45 MIN: CPT | Performed by: PHYSICIAN ASSISTANT

## 2024-04-30 PROCEDURE — 1123F ACP DISCUSS/DSCN MKR DOCD: CPT | Performed by: PHYSICIAN ASSISTANT

## 2024-04-30 RX ORDER — CALCIUM CARBONATE 500 MG/1
1 TABLET, CHEWABLE ORAL NIGHTLY PRN
COMMUNITY

## 2024-04-30 RX ORDER — MAGNESIUM 200 MG
200 TABLET ORAL DAILY
COMMUNITY

## 2024-04-30 RX ORDER — AMOXICILLIN 500 MG/1
500 TABLET, FILM COATED ORAL NIGHTLY PRN
COMMUNITY
Start: 2018-06-17

## 2024-04-30 RX ORDER — GABAPENTIN 300 MG/1
CAPSULE ORAL
Qty: 90 CAPSULE | Refills: 3 | Status: SHIPPED | OUTPATIENT
Start: 2024-04-30 | End: 2024-08-28

## 2024-04-30 RX ORDER — CHOLECALCIFEROL (VITAMIN D3) 125 MCG
500 CAPSULE ORAL DAILY
COMMUNITY

## 2024-04-30 RX ORDER — FAMOTIDINE 20 MG/1
20 TABLET, FILM COATED ORAL DAILY
COMMUNITY

## 2024-04-30 RX ORDER — ASPIRIN 81 MG/1
81 TABLET ORAL DAILY
COMMUNITY
Start: 2018-02-14

## 2024-04-30 NOTE — PROGRESS NOTES
[address] [facility] [address]  Dept: [phone]    PATIENT NAME: Elicia Antony  PATIENT MRN: [id number]  PRIMARY CARE PHYSICIAN: Matthew Bonilla MD    HPI:      Elicia Antony is a 79 y.o. female who presents to clinic today for evaluation of imbalance and extremity numbness. Other medial history is significant for     For neuropathic pain she receives Gabapentin 300 mg TID, Cymbalta 30 mg BID.    She was recently evaluated by Dr. Davis regarding low back pain. She also follows with pain management with pain pump placement; prior spinal cord stimulator was removed. Dr. Davis reviewed recent lumbar MRI and did not feel further surgical intervention was warranted.    Reports intermittent numbness and weakness affecting each extremity, separately- right arm numbness, left thigh burning sensation, right leg abrupt weakness with \"dead feeling.\" She has been advised to go the the ED regarding these symptoms, but had declined. There has been bladder frequency and incontinence. Unsure of urinary retention. She has 10 year history of rectal pain, but no incontinence of bowel. There has been an MRI lumbar since onset of these symptoms and no indication of cauda equina.    There is specifically sensation of left thigh lateral tenderness/ burning pain. This has been a problem for many years. No PT for this. It is tender to the touch. There was a total knee replacement on right.     The abrupt right leg weakness \"like it was dead\" is new to March 2024. It resolved after about an hour. Was noted upon standing from bed.     Right numbness of upper arm extending down dorsal forearm to all finger tips equally. She has noted that the fingers on her right hand seem to curl independently, especially in the morning. No neck pain radiating down right shoulder/ arm. When she extends her arm to brush her teeth, the right arm \"falls asleep.\" There is not associated discoloration or swelling.  There is no

## 2024-04-30 NOTE — PATIENT INSTRUCTIONS
Please check UA for UTI through PCP tomorrow    Please ask pain management if they think injection for left trochanteric bursitis is appropriate

## 2024-05-01 LAB
ANA SER QL IA: NEGATIVE
DSDNA IGG SER QL IA: 2.7 IU/ML
NUCLEAR IGG SER IA-RTO: <0.1 U/ML
T4 FREE SERPL-MCNC: 1.6 NG/DL (ref 0.92–1.68)

## 2024-05-03 ENCOUNTER — TELEPHONE (OUTPATIENT)
Dept: NEUROLOGY | Age: 79
End: 2024-05-03

## 2024-05-03 NOTE — TELEPHONE ENCOUNTER
Elicia called in stating that she spoke with PM and R regarding her EMG and the diagnosis for her order is wrong. She said that all of her issues are in her right arm and right leg. She wants the EMG for only the right side instead of all four extremities. Please advise.

## 2024-05-05 LAB — VIT B1 PYROPHOSHATE BLD-SCNC: 113 NMOL/L (ref 70–180)

## 2024-05-16 NOTE — TELEPHONE ENCOUNTER
Per my visit note she did discuss left leg symptoms separate from right leg, which is why x4 was ordered, but if she only wants right then that is fine, but if she has left leg symptoms this will not be evaluated by right EMG only

## 2024-05-21 NOTE — TELEPHONE ENCOUNTER
I spoke with Elicia.   She said that she had an injection in her left side for bursitis with pain management.   She still  has numbness of the rt. arm and rt. leg.   She said her pain management doctor is going to order it and they can send her to a facility that can get her in in two weeks.  Her PCP is ordering an MRI of the brain due to some cognitive issues and some gait abnormality.

## 2024-05-30 ENCOUNTER — TELEPHONE (OUTPATIENT)
Age: 79
End: 2024-05-30

## 2024-05-30 NOTE — TELEPHONE ENCOUNTER
Patient left a voicemail stating she is having vaginal pain and incontinence. She would like to schedule an appointment.

## 2024-06-13 ENCOUNTER — APPOINTMENT (OUTPATIENT)
Dept: CT IMAGING | Age: 79
DRG: 552 | End: 2024-06-13
Payer: MEDICARE

## 2024-06-13 ENCOUNTER — HOSPITAL ENCOUNTER (EMERGENCY)
Age: 79
Discharge: ANOTHER HOSPITAL | DRG: 552 | End: 2024-06-14
Attending: EMERGENCY MEDICINE
Payer: MEDICARE

## 2024-06-13 DIAGNOSIS — M43.12 SPONDYLOLISTHESIS OF CERVICAL REGION: ICD-10-CM

## 2024-06-13 DIAGNOSIS — G95.19 INTERMITTENT SPINAL CLAUDICATION (HCC): Primary | ICD-10-CM

## 2024-06-13 DIAGNOSIS — M48.02 FORAMINAL STENOSIS OF CERVICAL REGION: ICD-10-CM

## 2024-06-13 DIAGNOSIS — M48.00 CENTRAL STENOSIS OF SPINAL CANAL: ICD-10-CM

## 2024-06-13 LAB
ALBUMIN SERPL-MCNC: 3.7 G/DL (ref 3.5–5.2)
ALBUMIN/GLOB SERPL: 1.5 {RATIO} (ref 1–2.5)
ALP SERPL-CCNC: 77 U/L (ref 35–104)
ALT SERPL-CCNC: 52 U/L (ref 5–33)
ANION GAP SERPL CALCULATED.3IONS-SCNC: 11 MMOL/L (ref 9–17)
AST SERPL-CCNC: 66 U/L
BASOPHILS # BLD: 0.1 K/UL (ref 0–0.2)
BASOPHILS NFR BLD: 1 % (ref 0–2)
BILIRUB SERPL-MCNC: 0.5 MG/DL (ref 0.3–1.2)
BILIRUB UR QL STRIP: NEGATIVE
BUN SERPL-MCNC: 22 MG/DL (ref 8–23)
CALCIUM SERPL-MCNC: 8.9 MG/DL (ref 8.6–10.4)
CHLORIDE SERPL-SCNC: 105 MMOL/L (ref 98–107)
CK SERPL-CCNC: 153 U/L (ref 26–192)
CLARITY UR: CLEAR
CO2 SERPL-SCNC: 20 MMOL/L (ref 20–31)
COLOR UR: YELLOW
CREAT SERPL-MCNC: 1 MG/DL (ref 0.5–0.9)
CRP SERPL HS-MCNC: 150.2 MG/L (ref 0–5)
EOSINOPHIL # BLD: 0.1 K/UL (ref 0–0.4)
EOSINOPHILS RELATIVE PERCENT: 1 % (ref 1–4)
EPI CELLS #/AREA URNS HPF: NORMAL /HPF (ref 0–5)
ERYTHROCYTE [DISTWIDTH] IN BLOOD BY AUTOMATED COUNT: 13.7 % (ref 12.5–15.4)
ERYTHROCYTE [SEDIMENTATION RATE] IN BLOOD BY PHOTOMETRIC METHOD: 19 MM/HR (ref 0–30)
GFR, ESTIMATED: 58 ML/MIN/1.73M2
GLUCOSE SERPL-MCNC: 105 MG/DL (ref 70–99)
GLUCOSE UR STRIP-MCNC: NEGATIVE MG/DL
HCT VFR BLD AUTO: 34.7 % (ref 36–46)
HGB BLD-MCNC: 11.8 G/DL (ref 12–16)
HGB UR QL STRIP.AUTO: NEGATIVE
KETONES UR STRIP-MCNC: NEGATIVE MG/DL
LEUKOCYTE ESTERASE UR QL STRIP: NEGATIVE
LYMPHOCYTES NFR BLD: 1.1 K/UL (ref 1–4.8)
LYMPHOCYTES RELATIVE PERCENT: 15 % (ref 24–44)
MCH RBC QN AUTO: 30.4 PG (ref 26–34)
MCHC RBC AUTO-ENTMCNC: 34 G/DL (ref 31–37)
MCV RBC AUTO: 89.3 FL (ref 80–100)
MONOCYTES NFR BLD: 0.5 K/UL (ref 0.1–1.2)
MONOCYTES NFR BLD: 6 % (ref 2–11)
MYOGLOBIN SERPL-MCNC: 68 NG/ML (ref 25–58)
NEUTROPHILS NFR BLD: 77 % (ref 36–66)
NEUTS SEG NFR BLD: 6 K/UL (ref 1.8–7.7)
NITRITE UR QL STRIP: NEGATIVE
PH UR STRIP: 6 [PH] (ref 5–8)
PLATELET # BLD AUTO: 140 K/UL (ref 140–450)
PMV BLD AUTO: 9.9 FL (ref 6–12)
POTASSIUM SERPL-SCNC: 4.9 MMOL/L (ref 3.7–5.3)
PROT SERPL-MCNC: 6.2 G/DL (ref 6.4–8.3)
PROT UR STRIP-MCNC: NEGATIVE MG/DL
RBC # BLD AUTO: 3.88 M/UL (ref 4–5.2)
RBC #/AREA URNS HPF: NORMAL /HPF (ref 0–2)
SODIUM SERPL-SCNC: 136 MMOL/L (ref 135–144)
SP GR UR STRIP: 1.01 (ref 1–1.03)
UROBILINOGEN UR STRIP-ACNC: NORMAL EU/DL (ref 0–1)
WBC #/AREA URNS HPF: NORMAL /HPF (ref 0–5)
WBC OTHER # BLD: 7.7 K/UL (ref 3.5–11)

## 2024-06-13 PROCEDURE — 72125 CT NECK SPINE W/O DYE: CPT

## 2024-06-13 PROCEDURE — 84550 ASSAY OF BLOOD/URIC ACID: CPT

## 2024-06-13 PROCEDURE — 85025 COMPLETE CBC W/AUTO DIFF WBC: CPT

## 2024-06-13 PROCEDURE — 70450 CT HEAD/BRAIN W/O DYE: CPT

## 2024-06-13 PROCEDURE — 96374 THER/PROPH/DIAG INJ IV PUSH: CPT

## 2024-06-13 PROCEDURE — 81001 URINALYSIS AUTO W/SCOPE: CPT

## 2024-06-13 PROCEDURE — 99285 EMERGENCY DEPT VISIT HI MDM: CPT

## 2024-06-13 PROCEDURE — 86140 C-REACTIVE PROTEIN: CPT

## 2024-06-13 PROCEDURE — 83874 ASSAY OF MYOGLOBIN: CPT

## 2024-06-13 PROCEDURE — 85652 RBC SED RATE AUTOMATED: CPT

## 2024-06-13 PROCEDURE — 96372 THER/PROPH/DIAG INJ SC/IM: CPT

## 2024-06-13 PROCEDURE — 74176 CT ABD & PELVIS W/O CONTRAST: CPT

## 2024-06-13 PROCEDURE — 6370000000 HC RX 637 (ALT 250 FOR IP): Performed by: EMERGENCY MEDICINE

## 2024-06-13 PROCEDURE — 36415 COLL VENOUS BLD VENIPUNCTURE: CPT

## 2024-06-13 PROCEDURE — 6360000002 HC RX W HCPCS: Performed by: EMERGENCY MEDICINE

## 2024-06-13 PROCEDURE — 51798 US URINE CAPACITY MEASURE: CPT

## 2024-06-13 PROCEDURE — 82550 ASSAY OF CK (CPK): CPT

## 2024-06-13 PROCEDURE — 80053 COMPREHEN METABOLIC PANEL: CPT

## 2024-06-13 RX ORDER — ATENOLOL 25 MG/1
50 TABLET ORAL ONCE
Status: COMPLETED | OUTPATIENT
Start: 2024-06-13 | End: 2024-06-13

## 2024-06-13 RX ORDER — DULOXETIN HYDROCHLORIDE 30 MG/1
30 CAPSULE, DELAYED RELEASE ORAL ONCE
Status: COMPLETED | OUTPATIENT
Start: 2024-06-13 | End: 2024-06-13

## 2024-06-13 RX ORDER — DIAZEPAM 5 MG/1
5 TABLET ORAL ONCE
Status: COMPLETED | OUTPATIENT
Start: 2024-06-13 | End: 2024-06-13

## 2024-06-13 RX ORDER — ORPHENADRINE CITRATE 30 MG/ML
60 INJECTION INTRAMUSCULAR; INTRAVENOUS ONCE
Status: COMPLETED | OUTPATIENT
Start: 2024-06-13 | End: 2024-06-13

## 2024-06-13 RX ORDER — TRAZODONE HYDROCHLORIDE 50 MG/1
50 TABLET ORAL ONCE
Status: COMPLETED | OUTPATIENT
Start: 2024-06-13 | End: 2024-06-13

## 2024-06-13 RX ADMIN — DULOXETINE HYDROCHLORIDE 30 MG: 30 CAPSULE, DELAYED RELEASE ORAL at 23:40

## 2024-06-13 RX ADMIN — ORPHENADRINE CITRATE 60 MG: 60 INJECTION INTRAMUSCULAR; INTRAVENOUS at 20:01

## 2024-06-13 RX ADMIN — TRAZODONE HYDROCHLORIDE 50 MG: 50 TABLET ORAL at 23:41

## 2024-06-13 RX ADMIN — HYDROMORPHONE HYDROCHLORIDE 0.5 MG: 1 INJECTION, SOLUTION INTRAMUSCULAR; INTRAVENOUS; SUBCUTANEOUS at 20:00

## 2024-06-13 RX ADMIN — DIAZEPAM 5 MG: 5 TABLET ORAL at 22:11

## 2024-06-13 RX ADMIN — ATENOLOL 50 MG: 25 TABLET ORAL at 23:40

## 2024-06-13 ASSESSMENT — PAIN SCALES - GENERAL
PAINLEVEL_OUTOF10: 10
PAINLEVEL_OUTOF10: 10

## 2024-06-13 ASSESSMENT — ENCOUNTER SYMPTOMS
BACK PAIN: 1
NAUSEA: 0
ABDOMINAL PAIN: 0
DIARRHEA: 1
VOMITING: 0

## 2024-06-13 NOTE — ED PROVIDER NOTES
[facility] Emergency Department  [address]  [place] [address]  Phone: [phone]  Fax: [phone]       Pt Name: Elicia Antony  MRN: [id number]  Birthdate 1945  Date of evaluation: 6/13/24  PCP:  Matthew Bonilla MD    CHIEF COMPLAINT       Chief Complaint   Patient presents with    Extremity Weakness       HISTORY OF PRESENT ILLNESS  (Location/Symptom, Timing/Onset, Context/Setting, Quality, Duration, Modifying Factors, Severity.)    Elicia Antony is a 79 y.o. female who presents with  does have chronic back issues, follows both with neurosurgery as well as pain management who presents emergency department with a multitude of complaints.  She states that she was out yesterday working in the yard all day.  Then was walking around [employer] today when she had an episode of urinary and fecal incontinence with nonbloody diarrhea.  When she got home she started having weakness in both of her legs as well as worsening pain of her bilateral upper extremities as well.  She states that she is also had worsening of what she feels like arthritis in both of her hands.  She has not had any other abdominal symptoms.  No pain or blood with urination prior to the episode of incontinence.  She states that she has been taking her fentanyl and Neurontin as prescribed.     PAST MEDICAL / SURGICAL / SOCIAL / FAMILY HISTORY    has a past medical history of Anxiety, Chronic rectal pain, Gait instability, GERD (gastroesophageal reflux disease), Hypertension, and Neuropathy.     has a past surgical history that includes Nerve Block (03/05/2014); back surgery; joint replacement (Left); other surgical history; and Spinal cord stimulator implant.    Social History     Socioeconomic History    Marital status: Single     Spouse name: Not on file    Number of children: Not on file    Years of education: Not on file    Highest education level: Not on file   Occupational History    Not on file  treatments discussed and OARRS report reviewed today- OD risk 20. Recurring RX for gabapentin and fentanyl    [AO]   2108 CT HEAD WO CONTRAST [AO]   2108 CT LUMBAR SPINE BONY RECONSTRUCTION [AO]   2108 CT CERVICAL SPINE WO CONTRAST [AO]   2129 CT ABDOMEN PELVIS WO CONTRAST Additional Contrast? None [AO]   2213 Maya, CNP for NSG was messaged through [facility] about case. Requesting I speak to attending via [facility] access since it is interfacility  [AO]   2215 On the phone with [facility] [AO]   2225 Dr [address]  [AO]   2241 Malinda Greenberg CNP [AO]      ED Course User Index  [AO] Mariaa Beltre, DO       MDM  MDM  Number of Diagnoses or Management Options  Central stenosis of spinal canal  Foraminal stenosis of cervical region  Intermittent spinal claudication (HCC)  Spondylolisthesis of cervical region  Diagnosis management comments: Morbidly obese 79-year-old female who does have chronic back issues, follows both with neurosurgery as well as pain management who presents emergency department with a multitude of complaints.  She states that she was out yesterday working in the yard all day.  Then was walking around [facility] today when she had an episode of urinary and fecal incontinence with nonbloody diarrhea.  When she got home she started having weakness in both of her legs as well as worsening pain of her bilateral upper extremities as well.  She states that she is also had worsening of what she feels like arthritis in both of her hands.  She has not had any other abdominal symptoms.  No pain or blood with urination prior to the episode of incontinence.  She states that she has been taking her fentanyl and Neurontin as prescribed.  On exam she has pressured speech, anxious, somewhat hysterical.  On exam for me she has intact sensation and strength of bilateral lower extremities but some weakness in the hips.  However good proprioception.  Downgoing Babinski bilaterally.  She does have deformities to

## 2024-06-14 ENCOUNTER — HOSPITAL ENCOUNTER (INPATIENT)
Age: 79
LOS: 2 days | Discharge: HOME | DRG: 552 | End: 2024-06-16
Attending: FAMILY MEDICINE | Admitting: FAMILY MEDICINE
Payer: MEDICARE

## 2024-06-14 ENCOUNTER — APPOINTMENT (OUTPATIENT)
Dept: MRI IMAGING | Age: 79
DRG: 552 | End: 2024-06-14
Attending: FAMILY MEDICINE
Payer: MEDICARE

## 2024-06-14 VITALS
DIASTOLIC BLOOD PRESSURE: 81 MMHG | SYSTOLIC BLOOD PRESSURE: 146 MMHG | HEART RATE: 80 BPM | OXYGEN SATURATION: 96 % | RESPIRATION RATE: 14 BRPM | TEMPERATURE: 99 F

## 2024-06-14 PROBLEM — I73.9 CLAUDICATION (HCC): Status: ACTIVE | Noted: 2024-06-14

## 2024-06-14 PROCEDURE — 2580000003 HC RX 258: Performed by: NURSE PRACTITIONER

## 2024-06-14 PROCEDURE — 6370000000 HC RX 637 (ALT 250 FOR IP): Performed by: STUDENT IN AN ORGANIZED HEALTH CARE EDUCATION/TRAINING PROGRAM

## 2024-06-14 PROCEDURE — 6370000000 HC RX 637 (ALT 250 FOR IP): Performed by: NURSE PRACTITIONER

## 2024-06-14 PROCEDURE — 72146 MRI CHEST SPINE W/O DYE: CPT

## 2024-06-14 PROCEDURE — 1200000000 HC SEMI PRIVATE

## 2024-06-14 PROCEDURE — 72141 MRI NECK SPINE W/O DYE: CPT

## 2024-06-14 PROCEDURE — 51798 US URINE CAPACITY MEASURE: CPT

## 2024-06-14 PROCEDURE — 99223 1ST HOSP IP/OBS HIGH 75: CPT | Performed by: STUDENT IN AN ORGANIZED HEALTH CARE EDUCATION/TRAINING PROGRAM

## 2024-06-14 PROCEDURE — 6360000002 HC RX W HCPCS: Performed by: NURSE PRACTITIONER

## 2024-06-14 PROCEDURE — 72148 MRI LUMBAR SPINE W/O DYE: CPT

## 2024-06-14 RX ORDER — SODIUM CHLORIDE 0.9 % (FLUSH) 0.9 %
10 SYRINGE (ML) INJECTION PRN
Status: DISCONTINUED | OUTPATIENT
Start: 2024-06-14 | End: 2024-06-16 | Stop reason: HOSPADM

## 2024-06-14 RX ORDER — ONDANSETRON 4 MG/1
4 TABLET, ORALLY DISINTEGRATING ORAL EVERY 8 HOURS PRN
Status: DISCONTINUED | OUTPATIENT
Start: 2024-06-14 | End: 2024-06-16 | Stop reason: HOSPADM

## 2024-06-14 RX ORDER — ENOXAPARIN SODIUM 100 MG/ML
40 INJECTION SUBCUTANEOUS DAILY
Status: DISCONTINUED | OUTPATIENT
Start: 2024-06-14 | End: 2024-06-16 | Stop reason: HOSPADM

## 2024-06-14 RX ORDER — SODIUM CHLORIDE 9 MG/ML
INJECTION, SOLUTION INTRAVENOUS PRN
Status: DISCONTINUED | OUTPATIENT
Start: 2024-06-14 | End: 2024-06-16 | Stop reason: HOSPADM

## 2024-06-14 RX ORDER — GABAPENTIN 300 MG/1
300 CAPSULE ORAL 2 TIMES DAILY
Status: DISCONTINUED | OUTPATIENT
Start: 2024-06-14 | End: 2024-06-16 | Stop reason: HOSPADM

## 2024-06-14 RX ORDER — ACETAMINOPHEN 325 MG/1
650 TABLET ORAL EVERY 6 HOURS PRN
Status: DISCONTINUED | OUTPATIENT
Start: 2024-06-14 | End: 2024-06-16 | Stop reason: HOSPADM

## 2024-06-14 RX ORDER — ACETAMINOPHEN 650 MG/1
650 SUPPOSITORY RECTAL EVERY 6 HOURS PRN
Status: DISCONTINUED | OUTPATIENT
Start: 2024-06-14 | End: 2024-06-16 | Stop reason: HOSPADM

## 2024-06-14 RX ORDER — DIAZEPAM 5 MG/1
5 TABLET ORAL ONCE
Status: COMPLETED | OUTPATIENT
Start: 2024-06-14 | End: 2024-06-14

## 2024-06-14 RX ORDER — OXYCODONE HYDROCHLORIDE 5 MG/1
10 TABLET ORAL EVERY 4 HOURS PRN
Status: DISCONTINUED | OUTPATIENT
Start: 2024-06-14 | End: 2024-06-16 | Stop reason: HOSPADM

## 2024-06-14 RX ORDER — OXYCODONE HYDROCHLORIDE 5 MG/1
5 TABLET ORAL EVERY 4 HOURS PRN
Status: DISCONTINUED | OUTPATIENT
Start: 2024-06-14 | End: 2024-06-16 | Stop reason: HOSPADM

## 2024-06-14 RX ORDER — SODIUM CHLORIDE 0.9 % (FLUSH) 0.9 %
5-40 SYRINGE (ML) INJECTION EVERY 12 HOURS SCHEDULED
Status: DISCONTINUED | OUTPATIENT
Start: 2024-06-14 | End: 2024-06-16 | Stop reason: HOSPADM

## 2024-06-14 RX ORDER — POTASSIUM CHLORIDE 20 MEQ/1
40 TABLET, EXTENDED RELEASE ORAL PRN
Status: DISCONTINUED | OUTPATIENT
Start: 2024-06-14 | End: 2024-06-16 | Stop reason: HOSPADM

## 2024-06-14 RX ORDER — POTASSIUM CHLORIDE 7.45 MG/ML
10 INJECTION INTRAVENOUS PRN
Status: DISCONTINUED | OUTPATIENT
Start: 2024-06-14 | End: 2024-06-16 | Stop reason: HOSPADM

## 2024-06-14 RX ORDER — POLYETHYLENE GLYCOL 3350 17 G/17G
17 POWDER, FOR SOLUTION ORAL DAILY PRN
Status: DISCONTINUED | OUTPATIENT
Start: 2024-06-14 | End: 2024-06-16 | Stop reason: HOSPADM

## 2024-06-14 RX ORDER — OXYCODONE HYDROCHLORIDE AND ACETAMINOPHEN 5; 325 MG/1; MG/1
1 TABLET ORAL ONCE
Status: COMPLETED | OUTPATIENT
Start: 2024-06-14 | End: 2024-06-14

## 2024-06-14 RX ORDER — SPIRONOLACTONE 25 MG/1
100 TABLET ORAL DAILY
Status: DISCONTINUED | OUTPATIENT
Start: 2024-06-14 | End: 2024-06-16 | Stop reason: HOSPADM

## 2024-06-14 RX ORDER — TRAZODONE HYDROCHLORIDE 50 MG/1
100 TABLET ORAL NIGHTLY
Status: DISCONTINUED | OUTPATIENT
Start: 2024-06-15 | End: 2024-06-14

## 2024-06-14 RX ORDER — MAGNESIUM SULFATE 1 G/100ML
1000 INJECTION INTRAVENOUS PRN
Status: DISCONTINUED | OUTPATIENT
Start: 2024-06-14 | End: 2024-06-16 | Stop reason: HOSPADM

## 2024-06-14 RX ORDER — TRAZODONE HYDROCHLORIDE 50 MG/1
100 TABLET ORAL NIGHTLY
Status: DISCONTINUED | OUTPATIENT
Start: 2024-06-14 | End: 2024-06-16 | Stop reason: HOSPADM

## 2024-06-14 RX ORDER — ATENOLOL 50 MG/1
50 TABLET ORAL DAILY
Status: DISCONTINUED | OUTPATIENT
Start: 2024-06-14 | End: 2024-06-16 | Stop reason: HOSPADM

## 2024-06-14 RX ORDER — FAMOTIDINE 20 MG/1
20 TABLET, FILM COATED ORAL DAILY
Status: DISCONTINUED | OUTPATIENT
Start: 2024-06-15 | End: 2024-06-16 | Stop reason: HOSPADM

## 2024-06-14 RX ORDER — LEVOTHYROXINE SODIUM 0.12 MG/1
125 TABLET ORAL DAILY
Status: DISCONTINUED | OUTPATIENT
Start: 2024-06-15 | End: 2024-06-16 | Stop reason: HOSPADM

## 2024-06-14 RX ORDER — ONDANSETRON 2 MG/ML
4 INJECTION INTRAMUSCULAR; INTRAVENOUS EVERY 6 HOURS PRN
Status: DISCONTINUED | OUTPATIENT
Start: 2024-06-14 | End: 2024-06-16 | Stop reason: HOSPADM

## 2024-06-14 RX ADMIN — GABAPENTIN 300 MG: 300 CAPSULE ORAL at 21:38

## 2024-06-14 RX ADMIN — OXYCODONE HYDROCHLORIDE AND ACETAMINOPHEN 1 TABLET: 5; 325 TABLET ORAL at 02:28

## 2024-06-14 RX ADMIN — DIAZEPAM 5 MG: 5 TABLET ORAL at 15:09

## 2024-06-14 RX ADMIN — TRAZODONE HYDROCHLORIDE 100 MG: 50 TABLET ORAL at 22:35

## 2024-06-14 RX ADMIN — HYDROMORPHONE HYDROCHLORIDE 0.5 MG: 1 INJECTION, SOLUTION INTRAMUSCULAR; INTRAVENOUS; SUBCUTANEOUS at 06:43

## 2024-06-14 RX ADMIN — SODIUM CHLORIDE, PRESERVATIVE FREE 10 ML: 5 INJECTION INTRAVENOUS at 08:28

## 2024-06-14 RX ADMIN — OXYCODONE HYDROCHLORIDE 10 MG: 5 TABLET ORAL at 11:53

## 2024-06-14 RX ADMIN — OXYCODONE HYDROCHLORIDE 10 MG: 5 TABLET ORAL at 16:58

## 2024-06-14 RX ADMIN — SODIUM CHLORIDE, PRESERVATIVE FREE 10 ML: 5 INJECTION INTRAVENOUS at 21:39

## 2024-06-14 RX ADMIN — OXYCODONE HYDROCHLORIDE 10 MG: 5 TABLET ORAL at 21:38

## 2024-06-14 ASSESSMENT — PAIN DESCRIPTION - FREQUENCY
FREQUENCY: CONTINUOUS
FREQUENCY: CONTINUOUS

## 2024-06-14 ASSESSMENT — PAIN - FUNCTIONAL ASSESSMENT: PAIN_FUNCTIONAL_ASSESSMENT: ACTIVITIES ARE NOT PREVENTED

## 2024-06-14 ASSESSMENT — PAIN DESCRIPTION - ONSET
ONSET: ON-GOING
ONSET: ON-GOING

## 2024-06-14 ASSESSMENT — PAIN SCALES - GENERAL
PAINLEVEL_OUTOF10: 6
PAINLEVEL_OUTOF10: 8
PAINLEVEL_OUTOF10: 8
PAINLEVEL_OUTOF10: 3
PAINLEVEL_OUTOF10: 6
PAINLEVEL_OUTOF10: 9
PAINLEVEL_OUTOF10: 7
PAINLEVEL_OUTOF10: 5
PAINLEVEL_OUTOF10: 8
PAINLEVEL_OUTOF10: 6

## 2024-06-14 ASSESSMENT — PAIN DESCRIPTION - PAIN TYPE
TYPE: CHRONIC PAIN;ACUTE PAIN
TYPE: CHRONIC PAIN;ACUTE PAIN

## 2024-06-14 ASSESSMENT — PAIN DESCRIPTION - LOCATION
LOCATION: BACK;NECK;ARM;LEG
LOCATION: HAND
LOCATION: BACK;NECK;ARM

## 2024-06-14 ASSESSMENT — PAIN DESCRIPTION - DESCRIPTORS
DESCRIPTORS: ACHING
DESCRIPTORS: ACHING;DISCOMFORT

## 2024-06-14 NOTE — H&P
with Microscopic    Collection Time: 06/13/24  7:50 PM   Result Value Ref Range    Color, UA Yellow Yellow    Turbidity UA Clear Clear    Glucose, Ur NEGATIVE NEGATIVE mg/dL    Bilirubin, Urine NEGATIVE NEGATIVE    Ketones, Urine NEGATIVE NEGATIVE mg/dL    Specific Gravity, UA 1.010 1.005 - 1.030    Urine Hgb NEGATIVE NEGATIVE    pH, Urine 6.0 5.0 - 8.0    Protein, UA NEGATIVE NEGATIVE mg/dL    Urobilinogen, Urine Normal 0.0 - 1.0 EU/dL    Nitrite, Urine NEGATIVE NEGATIVE    Leukocyte Esterase, Urine NEGATIVE NEGATIVE    WBC, UA 2 TO 5 0 - 5 /HPF    RBC, UA 0 TO 2 0 - 2 /HPF    Epithelial Cells, UA 0 TO 2 0 - 5 /HPF   CBC with Auto Differential    Collection Time: 06/13/24 10:10 PM   Result Value Ref Range    WBC 7.7 3.5 - 11.0 k/uL    RBC 3.88 (L) 4.0 - 5.2 m/uL    Hemoglobin 11.8 (L) 12.0 - 16.0 g/dL    Hematocrit 34.7 (L) 36 - 46 %    MCV 89.3 80 - 100 fL    MCH 30.4 26 - 34 pg    MCHC 34.0 31 - 37 g/dL    RDW 13.7 12.5 - 15.4 %    Platelets 140 140 - 450 k/uL    MPV 9.9 6.0 - 12.0 fL    Neutrophils % 77 (H) 36 - 66 %    Lymphocytes % 15 (L) 24 - 44 %    Monocytes % 6 2 - 11 %    Eosinophils % 1 1 - 4 %    Basophils % 1 0 - 2 %    Neutrophils Absolute 6.00 1.8 - 7.7 k/uL    Lymphocytes Absolute 1.10 1.0 - 4.8 k/uL    Monocytes Absolute 0.50 0.1 - 1.2 k/uL    Eosinophils Absolute 0.10 0.0 - 0.4 k/uL    Basophils Absolute 0.10 0.0 - 0.2 k/uL   Sedimentation Rate    Collection Time: 06/13/24 10:10 PM   Result Value Ref Range    Sed Rate, Automated 19 0 - 30 mm/Hr       Imaging/Diagnostics:  CT ABDOMEN PELVIS WO CONTRAST Additional Contrast? None    Result Date: 6/13/2024  1. No acute abnormality of the abdomen or pelvis is identified. 2. No evidence of renal calculi or obstructive uropathy. 3. Colonic diverticulosis. 4. Additional chronic findings, as above.     CT CERVICAL SPINE WO CONTRAST    Result Date: 6/13/2024  No acute intracranial abnormalities are noted Grade 1 spondylolisthesis of C3 on C4 which appears

## 2024-06-14 NOTE — CONSULTS
reevaluated patient along with attending.      - Neurosurgical intervention pending reviewby attending neurosurgeon      - Neuro checks per protocol  - Hold all antiplatelets and anticoagulants      Additional recommendations may follow    Please contact neurosurgery with any changes in patients neurologic status.     Thank you for your consult.       ANDREIA Lam - [facility]     Neuroscience [place]   6/14/2024  2:49 AM

## 2024-06-14 NOTE — PLAN OF CARE
Problem: Discharge Planning  Goal: Discharge to home or other facility with appropriate resources  6/14/2024 1801 by Valeria Raya RN  Outcome: Progressing  6/14/2024 0445 by Harshil Montiel RN  Outcome: Progressing     Problem: Pain  Goal: Verbalizes/displays adequate comfort level or baseline comfort level  6/14/2024 1801 by Valeria Raya RN  Outcome: Progressing  6/14/2024 0445 by Harshil Montiel RN  Outcome: Progressing     Problem: Safety - Adult  Goal: Free from fall injury  6/14/2024 1801 by Valeria Raya RN  Outcome: Progressing  6/14/2024 0445 by Harshil Montiel RN  Outcome: Progressing     Problem: ABCDS Injury Assessment  Goal: Absence of physical injury  6/14/2024 1801 by Valeria Raya RN  Outcome: Progressing  6/14/2024 0445 by Harshil Montiel RN  Outcome: Progressing     Problem: Neurosensory - Adult  Goal: Achieves stable or improved neurological status  6/14/2024 1801 by Valeria Raya RN  Outcome: Progressing  6/14/2024 0445 by Harshil Montiel RN  Outcome: Progressing

## 2024-06-14 NOTE — PLAN OF CARE
Problem: Discharge Planning  Goal: Discharge to home or other facility with appropriate resources  Outcome: Progressing     Problem: Pain  Goal: Verbalizes/displays adequate comfort level or baseline comfort level  Outcome: Progressing     Problem: Safety - Adult  Goal: Free from fall injury  Outcome: Progressing     Problem: ABCDS Injury Assessment  Goal: Absence of physical injury  Outcome: Progressing     Problem: Neurosensory - Adult  Goal: Achieves stable or improved neurological status  Outcome: Progressing     Problem: Musculoskeletal - Adult  Goal: Return mobility to safest level of function  Outcome: Progressing  Goal: Maintain proper alignment of affected body part  Outcome: Progressing

## 2024-06-14 NOTE — PLAN OF CARE
Rounded with Dr. Guthrie  Will order MRI C/T/L  Patient complaining more of neck pain and stiffness in her hands from arthritis at this time  States her pain in her back is not bothering her as much as her neck and hands  Patient is requesting more dilaudid and a medrol pack  However, discussed we will wait for the MRI results prior to adding or eliminating medications  Okay for diet and activity from a neurosurgery standpoint    Electronically signed by ANDREIA Crandall CNP on 6/14/2024 at 10:25 AM

## 2024-06-15 LAB
ANION GAP SERPL CALCULATED.3IONS-SCNC: 12 MMOL/L (ref 9–16)
BUN SERPL-MCNC: 13 MG/DL (ref 8–23)
CALCIUM SERPL-MCNC: 9.3 MG/DL (ref 8.6–10.4)
CHLORIDE SERPL-SCNC: 101 MMOL/L (ref 98–107)
CO2 SERPL-SCNC: 23 MMOL/L (ref 20–31)
CREAT SERPL-MCNC: 1 MG/DL (ref 0.5–0.9)
GFR, ESTIMATED: 57 ML/MIN/1.73M2
GLUCOSE SERPL-MCNC: 119 MG/DL (ref 74–99)
INR PPP: 1.1
POTASSIUM SERPL-SCNC: 4.2 MMOL/L (ref 3.7–5.3)
PROTHROMBIN TIME: 13.8 SEC (ref 11.7–14.9)
SODIUM SERPL-SCNC: 136 MMOL/L (ref 136–145)
URATE SERPL-MCNC: 5 MG/DL (ref 2.4–5.7)

## 2024-06-15 PROCEDURE — 6370000000 HC RX 637 (ALT 250 FOR IP): Performed by: STUDENT IN AN ORGANIZED HEALTH CARE EDUCATION/TRAINING PROGRAM

## 2024-06-15 PROCEDURE — 6370000000 HC RX 637 (ALT 250 FOR IP): Performed by: NURSE PRACTITIONER

## 2024-06-15 PROCEDURE — 1200000000 HC SEMI PRIVATE

## 2024-06-15 PROCEDURE — 2580000003 HC RX 258: Performed by: NURSE PRACTITIONER

## 2024-06-15 PROCEDURE — 6360000002 HC RX W HCPCS

## 2024-06-15 PROCEDURE — 99232 SBSQ HOSP IP/OBS MODERATE 35: CPT | Performed by: STUDENT IN AN ORGANIZED HEALTH CARE EDUCATION/TRAINING PROGRAM

## 2024-06-15 PROCEDURE — 85610 PROTHROMBIN TIME: CPT

## 2024-06-15 PROCEDURE — 80048 BASIC METABOLIC PNL TOTAL CA: CPT

## 2024-06-15 PROCEDURE — 36415 COLL VENOUS BLD VENIPUNCTURE: CPT

## 2024-06-15 RX ORDER — METHYLPREDNISOLONE 4 MG/1
4 TABLET ORAL
Status: DISCONTINUED | OUTPATIENT
Start: 2024-06-16 | End: 2024-06-16 | Stop reason: HOSPADM

## 2024-06-15 RX ORDER — METHYLPREDNISOLONE 4 MG/1
8 TABLET ORAL NIGHTLY
Status: DISCONTINUED | OUTPATIENT
Start: 2024-06-16 | End: 2024-06-16 | Stop reason: HOSPADM

## 2024-06-15 RX ORDER — METHYLPREDNISOLONE 4 MG/1
4 TABLET ORAL DAILY
Status: DISCONTINUED | OUTPATIENT
Start: 2024-06-15 | End: 2024-06-15

## 2024-06-15 RX ORDER — METHYLPREDNISOLONE 4 MG/1
TABLET ORAL
Qty: 1 KIT | Refills: 0 | Status: SHIPPED | OUTPATIENT
Start: 2024-06-15 | End: 2024-06-21

## 2024-06-15 RX ORDER — METHYLPREDNISOLONE 4 MG/1
4 TABLET ORAL NIGHTLY
Status: DISCONTINUED | OUTPATIENT
Start: 2024-06-17 | End: 2024-06-16 | Stop reason: HOSPADM

## 2024-06-15 RX ADMIN — GABAPENTIN 300 MG: 300 CAPSULE ORAL at 08:45

## 2024-06-15 RX ADMIN — METHYLPREDNISOLONE 24 MG: 16 TABLET ORAL at 15:07

## 2024-06-15 RX ADMIN — ATENOLOL 50 MG: 50 TABLET ORAL at 20:36

## 2024-06-15 RX ADMIN — TRAZODONE HYDROCHLORIDE 100 MG: 50 TABLET ORAL at 20:33

## 2024-06-15 RX ADMIN — OXYCODONE HYDROCHLORIDE 10 MG: 5 TABLET ORAL at 11:52

## 2024-06-15 RX ADMIN — OXYCODONE HYDROCHLORIDE 10 MG: 5 TABLET ORAL at 16:08

## 2024-06-15 RX ADMIN — SODIUM CHLORIDE, PRESERVATIVE FREE 10 ML: 5 INJECTION INTRAVENOUS at 20:33

## 2024-06-15 RX ADMIN — OXYCODONE HYDROCHLORIDE 10 MG: 5 TABLET ORAL at 20:33

## 2024-06-15 RX ADMIN — OXYCODONE HYDROCHLORIDE 10 MG: 5 TABLET ORAL at 06:55

## 2024-06-15 RX ADMIN — OXYCODONE HYDROCHLORIDE 10 MG: 5 TABLET ORAL at 02:06

## 2024-06-15 RX ADMIN — GABAPENTIN 300 MG: 300 CAPSULE ORAL at 20:33

## 2024-06-15 RX ADMIN — FAMOTIDINE 20 MG: 20 TABLET, FILM COATED ORAL at 08:45

## 2024-06-15 RX ADMIN — LEVOTHYROXINE SODIUM 125 MCG: 0.12 TABLET ORAL at 06:55

## 2024-06-15 RX ADMIN — SODIUM CHLORIDE, PRESERVATIVE FREE 10 ML: 5 INJECTION INTRAVENOUS at 08:46

## 2024-06-15 ASSESSMENT — PAIN DESCRIPTION - FREQUENCY: FREQUENCY: CONTINUOUS

## 2024-06-15 ASSESSMENT — PAIN SCALES - GENERAL
PAINLEVEL_OUTOF10: 5
PAINLEVEL_OUTOF10: 7
PAINLEVEL_OUTOF10: 8
PAINLEVEL_OUTOF10: 8
PAINLEVEL_OUTOF10: 7
PAINLEVEL_OUTOF10: 6
PAINLEVEL_OUTOF10: 9
PAINLEVEL_OUTOF10: 10
PAINLEVEL_OUTOF10: 8
PAINLEVEL_OUTOF10: 5
PAINLEVEL_OUTOF10: 5

## 2024-06-15 ASSESSMENT — PAIN DESCRIPTION - LOCATION
LOCATION: HAND;BACK
LOCATION: BACK
LOCATION: BACK
LOCATION: BACK;HAND

## 2024-06-15 ASSESSMENT — PAIN DESCRIPTION - ONSET: ONSET: ON-GOING

## 2024-06-15 ASSESSMENT — PAIN DESCRIPTION - DESCRIPTORS: DESCRIPTORS: ACHING;DISCOMFORT

## 2024-06-15 ASSESSMENT — PAIN DESCRIPTION - PAIN TYPE: TYPE: ACUTE PAIN

## 2024-06-15 NOTE — PLAN OF CARE
Problem: Discharge Planning  Goal: Discharge to home or other facility with appropriate resources  6/15/2024 1413 by Valeria Raya RN  Outcome: Progressing  6/15/2024 0412 by Etta Terry RN  Outcome: Progressing     Problem: Pain  Goal: Verbalizes/displays adequate comfort level or baseline comfort level  6/15/2024 1413 by Valeria Raya RN  Outcome: Progressing  6/15/2024 0412 by Etta Terry RN  Outcome: Progressing     Problem: Safety - Adult  Goal: Free from fall injury  6/15/2024 1413 by Valeria Raya RN  Outcome: Progressing  6/15/2024 0412 by Etta Terry RN  Outcome: Progressing     Problem: ABCDS Injury Assessment  Goal: Absence of physical injury  6/15/2024 1413 by Valeria Raya RN  Outcome: Progressing  6/15/2024 0412 by Etta Terry RN  Outcome: Progressing     Problem: Neurosensory - Adult  Goal: Achieves stable or improved neurological status  6/15/2024 1413 by Valeria Raya RN  Outcome: Progressing  6/15/2024 0412 by Etta Terry RN  Outcome: Progressing     Problem: Musculoskeletal - Adult  Goal: Return mobility to safest level of function  6/15/2024 1413 by Valeria Raya RN  Outcome: Progressing  6/15/2024 0412 by Etta Terry RN  Outcome: Progressing  Goal: Maintain proper alignment of affected body part  6/15/2024 1413 by Valeria Raya RN  Outcome: Progressing  6/15/2024 0412 by Etta Terry RN  Outcome: Progressing

## 2024-06-15 NOTE — DISCHARGE INSTRUCTIONS
Follow up with your PCP in 3 days. Call for an appointment as soon as possible.  Follow-up with specialist as instructed.  Call for an appointment as soon as possible.  Medications as instructed.  Return to the emergency department immediately for any new or worsening concerns.

## 2024-06-15 NOTE — PLAN OF CARE
Problem: Discharge Planning  Goal: Discharge to home or other facility with appropriate resources  6/15/2024 0412 by Etta Terry RN  Outcome: Progressing  6/14/2024 1801 by Valeria Raya RN  Outcome: Progressing     Problem: Pain  Goal: Verbalizes/displays adequate comfort level or baseline comfort level  6/15/2024 0412 by Etta Terry RN  Outcome: Progressing  6/14/2024 1801 by Valeria Raya RN  Outcome: Progressing     Problem: Safety - Adult  Goal: Free from fall injury  6/15/2024 0412 by Etta Terry RN  Outcome: Progressing  6/14/2024 1801 by Valeria Raya RN  Outcome: Progressing     Problem: ABCDS Injury Assessment  Goal: Absence of physical injury  6/15/2024 0412 by Etta Terry RN  Outcome: Progressing  6/14/2024 1801 by Valeria Raya RN  Outcome: Progressing     Problem: Neurosensory - Adult  Goal: Achieves stable or improved neurological status  6/15/2024 0412 by Etta Terry RN  Outcome: Progressing  6/14/2024 1801 by Valeria Raya RN  Outcome: Progressing     Problem: Musculoskeletal - Adult  Goal: Return mobility to safest level of function  6/15/2024 0412 by Etta Terry RN  Outcome: Progressing  6/14/2024 1801 by Valeria Raya RN  Outcome: Progressing  Goal: Maintain proper alignment of affected body part  6/15/2024 0412 by Etta Terry RN  Outcome: Progressing  6/14/2024 1801 by Valeria Raya RN  Outcome: Progressing

## 2024-06-16 VITALS
RESPIRATION RATE: 16 BRPM | TEMPERATURE: 98 F | DIASTOLIC BLOOD PRESSURE: 71 MMHG | OXYGEN SATURATION: 96 % | SYSTOLIC BLOOD PRESSURE: 150 MMHG | BODY MASS INDEX: 36 KG/M2 | HEART RATE: 71 BPM | HEIGHT: 59 IN | WEIGHT: 178.57 LBS

## 2024-06-16 PROCEDURE — 97535 SELF CARE MNGMENT TRAINING: CPT

## 2024-06-16 PROCEDURE — 6360000002 HC RX W HCPCS

## 2024-06-16 PROCEDURE — 97530 THERAPEUTIC ACTIVITIES: CPT

## 2024-06-16 PROCEDURE — 99239 HOSP IP/OBS DSCHRG MGMT >30: CPT | Performed by: STUDENT IN AN ORGANIZED HEALTH CARE EDUCATION/TRAINING PROGRAM

## 2024-06-16 PROCEDURE — 6370000000 HC RX 637 (ALT 250 FOR IP): Performed by: STUDENT IN AN ORGANIZED HEALTH CARE EDUCATION/TRAINING PROGRAM

## 2024-06-16 PROCEDURE — 97161 PT EVAL LOW COMPLEX 20 MIN: CPT

## 2024-06-16 PROCEDURE — 97165 OT EVAL LOW COMPLEX 30 MIN: CPT

## 2024-06-16 PROCEDURE — 6370000000 HC RX 637 (ALT 250 FOR IP): Performed by: NURSE PRACTITIONER

## 2024-06-16 RX ADMIN — POLYETHYLENE GLYCOL 3350 17 G: 17 POWDER, FOR SOLUTION ORAL at 00:56

## 2024-06-16 RX ADMIN — FAMOTIDINE 20 MG: 20 TABLET, FILM COATED ORAL at 08:35

## 2024-06-16 RX ADMIN — METHYLPREDNISOLONE 4 MG: 4 TABLET ORAL at 07:12

## 2024-06-16 RX ADMIN — GABAPENTIN 300 MG: 300 CAPSULE ORAL at 08:35

## 2024-06-16 RX ADMIN — OXYCODONE HYDROCHLORIDE 10 MG: 5 TABLET ORAL at 00:56

## 2024-06-16 RX ADMIN — METHYLPREDNISOLONE 4 MG: 4 TABLET ORAL at 11:55

## 2024-06-16 RX ADMIN — OXYCODONE HYDROCHLORIDE 10 MG: 5 TABLET ORAL at 07:14

## 2024-06-16 RX ADMIN — LEVOTHYROXINE SODIUM 125 MCG: 0.12 TABLET ORAL at 07:12

## 2024-06-16 RX ADMIN — OXYCODONE HYDROCHLORIDE 10 MG: 5 TABLET ORAL at 11:55

## 2024-06-16 ASSESSMENT — PAIN SCALES - GENERAL
PAINLEVEL_OUTOF10: 5
PAINLEVEL_OUTOF10: 6
PAINLEVEL_OUTOF10: 5
PAINLEVEL_OUTOF10: 9
PAINLEVEL_OUTOF10: 7

## 2024-06-16 ASSESSMENT — PAIN DESCRIPTION - LOCATION
LOCATION: BACK
LOCATION: BACK

## 2024-06-16 ASSESSMENT — PAIN DESCRIPTION - ONSET: ONSET: ON-GOING

## 2024-06-16 ASSESSMENT — PAIN DESCRIPTION - ORIENTATION: ORIENTATION: LOWER

## 2024-06-16 ASSESSMENT — PAIN DESCRIPTION - DESCRIPTORS: DESCRIPTORS: ACHING;DISCOMFORT

## 2024-06-16 ASSESSMENT — PAIN DESCRIPTION - FREQUENCY: FREQUENCY: CONTINUOUS

## 2024-06-16 NOTE — PLAN OF CARE
Problem: Discharge Planning  Goal: Discharge to home or other facility with appropriate resources  6/16/2024 0842 by Valeria Raya RN  Outcome: Adequate for Discharge  6/16/2024 0314 by Etta Terry RN  Outcome: Progressing     Problem: Pain  Goal: Verbalizes/displays adequate comfort level or baseline comfort level  6/16/2024 0842 by Valeria Raya RN  Outcome: Adequate for Discharge  6/16/2024 0314 by Etta Terry RN  Outcome: Progressing     Problem: Safety - Adult  Goal: Free from fall injury  6/16/2024 0842 by Valeria Raya RN  Outcome: Adequate for Discharge  6/16/2024 0314 by Etta Terry RN  Outcome: Progressing     Problem: ABCDS Injury Assessment  Goal: Absence of physical injury  6/16/2024 0842 by Valeria Raya RN  Outcome: Adequate for Discharge  6/16/2024 0314 by Etta Terry RN  Outcome: Progressing     Problem: Neurosensory - Adult  Goal: Achieves stable or improved neurological status  6/16/2024 0842 by Valeria Raya RN  Outcome: Adequate for Discharge  6/16/2024 0314 by Etta Terry RN  Outcome: Progressing     Problem: Musculoskeletal - Adult  Goal: Return mobility to safest level of function  6/16/2024 0842 by Valeria Raya RN  Outcome: Adequate for Discharge  6/16/2024 0314 by Etta Terry RN  Outcome: Progressing  Goal: Maintain proper alignment of affected body part  6/16/2024 0842 by Valeria Raya RN  Outcome: Adequate for Discharge  6/16/2024 0314 by Etta Terry RN  Outcome: Progressing

## 2024-06-16 NOTE — CARE COORDINATION
Transitional Planning    1110 Faxed DME order/facesheet/FTF to [employer]    1115 Spoke to [employer], have pt call them tomorrow in the am and they will deliver to the home, verified address with patient.     1118 Patient agreeable to home delivery and was provided with the orders and phone number to call.     Discharge Report    [facility]  Clinical Case Management Department  Written by: MANUELITO MAZARIEGOS    Patient Name: Elicia RAGHU Antony  Attending Provider: Eran Mari DO  Admit Date: 2024  1:45 AM  MRN: [id number]  Account: [id number]                     : 1945  Discharge Date: 24      Disposition: home    MANUELITO MAZARIEGOS    
individualized plan of care/goals and shares the quality data associated with the providers was provided to:     Patient Representative Name:       The Patient and/or Patient Representative Agree with the Discharge Plan?      Luba Roque RN  Case Management Department  Ph: [phone] Fax:

## 2024-06-16 NOTE — DISCHARGE SUMMARY
[facility]  Office: [phone]  Ramiro Robison DO, Rj Everett DO, Wagner Espitia DO, Matt Wyatt DO, Uche Nguyen MD, Brianna Padilla MD, Amena Kaur MD, Tara Keller MD,  Gonzalo Barnett MD, Gianluca Hooper MD, Suresh Ruiz MD,  Darnell Kelly DO, Tracy Lane MD, Paxton Dutta MD, Wilfrido Robison DO, Abbie Munguia MD,  Eran Mari DO, Natalie Delgado MD, Michaelle Azul MD, Zamzam Guallpa MD, Chela Capellan MD,  Rl Gay MD, Gene Corbin MD, Letty Gallego MD, Dallas Reilly MD, Sathya Bey MD, Owen Lock MD, Jose E Tom DO, Van Flores DO, Gloria Ochoa MD,  Jesu Sandoval MD, Shirley Waterhouse, CNP,  Brissa Daniel CNP, Agustín Tena, CNP,  Rissa De Oliveira, DNP, Trini Mack, CNP, Rissa Kendrick, CNP, Malinda Martinez, CNP, Dai Rizzo, CNP, Elvia Stokes, PA-C, Brooke Madera PA-C, Luisa Martin, CNP, Georgia Cantrell, CNP, Abilio Lemon, CNP, Rosalina Pemberton, CNP, Kera Parmar, CNP, Sangita Cook, CNS, Torie Lozano, CNP, Ann Dunlap CNP, Tracy Schwab, CNP         [facility]   IN-PATIENT SERVICE   [facility]    Discharge Summary     Patient ID: Elicia Antony  :  1945   MRN: [id number]     ACCOUNT:  [id number]   Patient's PCP: Matthew Bonilla MD  Admit Date: 2024   Discharge Date: 2024   Length of Stay: 2  Code Status:  Full Code  Admitting Physician: Amena Kaur MD  Discharge Physician: Eran Mari DO     Active Discharge Diagnoses:     Hospital Problem Lists:  Principal Problem:    Claudication (HCC)  Resolved Problems:    * No resolved hospital problems. *    Admission Condition:  fair     Discharged Condition: good    Hospital Stay:     Hospital Course:      Elicia Antony is a 79 y.o. female with PMHx of chronic back pain follows with Dr. Davis and pain management who presented as a transfer from [place] ED with complaints of urinary and fecal incontinence with

## 2024-06-16 NOTE — PLAN OF CARE
Problem: Discharge Planning  Goal: Discharge to home or other facility with appropriate resources  6/16/2024 0314 by Etta Terry RN  Outcome: Progressing  6/15/2024 1413 by Valeria Raya RN  Outcome: Progressing     Problem: Pain  Goal: Verbalizes/displays adequate comfort level or baseline comfort level  6/16/2024 0314 by Etta Terry RN  Outcome: Progressing  6/15/2024 1413 by Valeria Raya RN  Outcome: Progressing     Problem: Safety - Adult  Goal: Free from fall injury  6/16/2024 0314 by Etta Terry RN  Outcome: Progressing  6/15/2024 1413 by Valeria Raya RN  Outcome: Progressing     Problem: ABCDS Injury Assessment  Goal: Absence of physical injury  6/16/2024 0314 by Etta Terry RN  Outcome: Progressing  6/15/2024 1413 by Valeria Raya RN  Outcome: Progressing     Problem: Neurosensory - Adult  Goal: Achieves stable or improved neurological status  6/16/2024 0314 by Etta Terry RN  Outcome: Progressing  6/15/2024 1413 by Valeria Raya RN  Outcome: Progressing     Problem: Musculoskeletal - Adult  Goal: Return mobility to safest level of function  6/16/2024 0314 by Etta Terry RN  Outcome: Progressing  6/15/2024 1413 by Valeria Raya RN  Outcome: Progressing  Goal: Maintain proper alignment of affected body part  6/16/2024 0314 by Etta Terry RN  Outcome: Progressing  6/15/2024 1413 by Valeria Raya RN  Outcome: Progressing

## 2024-06-16 NOTE — PROGRESS NOTES
CLINICAL PHARMACY NOTE: MEDS TO BEDS    Total # of Prescriptions Filled: 1   The following medications were delivered to the patient:  Medrol dose pack    Additional Documentation: meds delivered to the pt in room 247 on 06.16.24 at 10:57 $5 co pay, paid with a card on the [facility]over. No visitors  
Durable Medical Equipment.      Elicia Antony was evaluated today and a DME order was entered for a wheeled walker with seat because she requires this to successfully complete daily living tasks of eating, bathing, toileting, personal cares, ambulating, grooming, hygiene, dressing upper body, dressing lower body, meal preparation, and taking own medications.  A wheeled walker with seat is necessary due to the patient's unsteady gait, upper body weakness, inability to  and ambulation device, ambulating only short distances by pushing a walker, and the need to sit for a short time before resuming ambulation.  These tasks cannot be completed with a lesser ambulation device such as a cane, crutch, or standard walker.  The need for this equipment was discussed with the patient and she understands and is in agreement.       Elicia Antony requires a bedside commode due to being confined to one level of the home and there are no toilet facilities on that level, and is physically incapable of utilizing regular toilet facilities. Current body weight: Weight - Scale: 81 kg (178 lb 9.2 oz).     Eran Mari DO  [place], [place]  6/16/2024 10:21 AM      
Neurosurgery PHILLIP/Resident    Daily Progress Note   No chief complaint on file.    6/15/2024  2:30 PM    Chart reviewed.  No acute events overnight.  Patient very tearful with complaints about rectal pain, back pain, neck pain, right middle finger trigger finger with hand pain. States that the current pain regimen that she is on has not been helping. Patient asked for a medrol pack.     Vitals:    06/15/24 0236 06/15/24 0715 06/15/24 0725 06/15/24 1222   BP:  120/68     Pulse:  70     Resp: 18 18 16 16   Temp:  98.1 °F (36.7 °C)     TempSrc:  Oral     SpO2:  96%     Weight:       Height:             PE:   AOx3   CNII-XII intact   PERRL, EOMI   Motor   L deltoid 5/5; R deltoid 5/5  L biceps 5/5; R biceps 5/5  L triceps 5/5; R triceps 5/5  L wrist extension 5/5; R wrist extension 5/5  L intrinsics 5/5; R intrinsics 5/5      L iliopsoas 5/5 , R iliopsoas 5/5  L quadriceps 5/5; R quadriceps 5/5  L Dorsiflexion 5/5; R dorsiflexion 5/5  L Plantarflexion 5/5; R plantarflexion 5/5  L EHL 5/5; R EHL 5/5    Sensation: N/T to BLE mid-thigh distally      Lab Results   Component Value Date    WBC 7.7 06/13/2024    HGB 11.8 (L) 06/13/2024    HCT 34.7 (L) 06/13/2024     06/13/2024    ALT 52 (H) 06/13/2024    AST 66 (H) 06/13/2024     06/15/2024    K 4.2 06/15/2024     06/15/2024    CREATININE 1.0 (H) 06/15/2024    BUN 13 06/15/2024    CO2 23 06/15/2024    TSH 0.08 (L) 04/30/2024    INR 1.1 06/15/2024    .2 (H) 06/13/2024    SEDRATE 19 06/13/2024       A/P: 79 y.o. female who presents with history of chronic back pain with new subjective hand numbness and leg wekness   Patient care will be discussed with attending, will reevaluated patient along with attending.                  - No neurosurgical intervention at this time   - Will order a medrol pack   - patient can follow up with Dr. Davis as needed    Neurosurgery will sign off. Please call with any questions or concerns.      Electronically signed 
Physical Therapy        Physical Therapy Cancel Note      DATE: 2024    NAME: Elicia Antony  MRN: [id number]   : 1945      Patient not seen this date for Physical Therapy due to:    Other: await neurosurgery POC, ck pm as able or 6/15      Electronically signed by Moe Orellana PT on 2024 at 8:17 AM      
Physical Therapy  Facility/Department: [address] ORTHO/MED SURG  Physical Therapy Initial Assessment    Name: Elicia Antony  : 1945  MRN: [id number]  Date of Service: 2024    Discharge Recommendations:  No therapy recommended at discharge   PT Equipment Recommendations  Equipment Needed: Yes  Mobility Devices: Walker  Walker: Rollator (4 Wheeled)      Patient Diagnosis(es): There were no encounter diagnoses.  Past Medical History:  has a past medical history of Anxiety, Chronic rectal pain, Gait instability, GERD (gastroesophageal reflux disease), Hypertension, and Neuropathy.  Past Surgical History:  has a past surgical history that includes Nerve Block (2014); back surgery; joint replacement (Left); other surgical history; and Spinal cord stimulator implant.    Assessment   Assessment: The pt ambulated 150 ft without a device x SBA. She ambulated safely without a device but felt more secure with the use of a walker. No further PT intervention is needed at this time  Therapy Prognosis: Good  Decision Making: Medium Complexity  Requires PT Follow-Up: No  Activity Tolerance  Activity Tolerance: Patient tolerated treatment well     Plan   Physical Therapy Plan  General Plan: Discharge with evaluation only  Safety Devices  Type of Devices: Call light within reach, Nurse notified  Restraints  Restraints Initially in Place: No     Restrictions  Restrictions/Precautions  Required Braces or Orthoses?: No  Position Activity Restriction  Other position/activity restrictions: up with assist     Subjective   General  Patient assessed for rehabilitation services?: Yes  Response To Previous Treatment: Not applicable  Family / Caregiver Present: No  Follows Commands: Within Functional Limits  Subjective  Subjective: The pt reported rectal pain of 7/10       Repositioned pt for comfort         Social/Functional History  Social/Functional History  Lives With: Alone  Type of Home: Condo  Home Layout: Two level, Able 
Pt dc  per wc to front door  Pt dc to home by private car and family Pt left with all belonging Pts walker and commode to be delivered to her home  
Pt given dc instructions . Pt  verbalized understanding meds to beds delivered  pt waiting on ride and daughter and walker and bedside commode for home  
Pt requesting another dose of valuim for anxiety and home meds Notified Dr Lock per perfect serve valuim ordered and given   
Pt requesting writer to call  to order a medrol dose pack. And to try that today and see how that does and go home tomorrow Pt states neuro surg Dr just rounded and said no surgical intervention needed. Notified Dr Lock above  Dr Lock ordered steroid waiting on pharmacy to send  
[facility]  Office: [phone]  Ramiro Robison DO, Rj Everett DO, Wagner Espitia DO, Matt Wyatt DO, Uche Nguyen MD, Brianna Padilla MD, Amena Kaur MD, Tara Keller MD,  Gonzalo Barnett MD, Gianluca Hooper MD, Suresh Ruiz MD,  Darnell Kelly DO, Tracy Lane MD, Paxton Dutta MD, Wilfrido Robison DO, Abbie Munguia MD,  Eran Mari DO, Natalie Delgado MD, Michaelle Azul MD, Zamzam Guallpa MD, Chela Capellan MD,  Rl Gay MD, Gene Corbin MD, Letty Gallego MD, Dallas Reilly MD, Sathya Bey MD, Owen Lock MD, Jose E Tom DO, Van Flores DO, Gloria Ochoa MD,  Jesu Sandoval MD, Shirley Waterhouse, CNP,  Brissa Daniel CNP, Agustín Tena, CNP,  Rissa De Oliveira, DNP, Trini Mack, CNP, Rissa Kendrick, CNP, Malinda Martinez, CNP, Dai Rizzo, CNP, Elvia Stokes, PA-C, Brooke Madera PA-C, Luisa Martin, CNP, Georgia Cantrell, CNP, Abilio Lemon, CNP, Rosalina Pemberton, CNP, Kera Parmar, CNP, Sangita Cook, CNS, Torie Lozano, CNP, Ann Dunlap CNP, Tracy Schwab, CNP         [facility]   IN-PATIENT SERVICE   [facility]    Progress Note    6/16/2024    8:09 AM    Name:   Elicia Antony  MRN:     [id number]     Acct:      [id number]   Room:   0247/0247-01   Day:  2  Admit Date:  6/14/2024  1:45 AM    PCP:   Matthew Bonilla MD  Code Status:  Full Code    Subjective:     C/C: Back pain with worsening weakness.    Interval History Status: improved.     Vitals reviewed, afebrile and hemodynamically stable.  Saturating well on room air.  Labs reviewed, stable.   Overnight patient had no significant events.    On examination patient significant improvement with Medrol Dose Pack. Evaluated by neurosurgery 6/15/2024.  Neurosurgery recommended physical therapy/physical activity and to wean off opiate medications but no surgical intervention planned.  Patient was started on Medrol Dosepak with plan to discharge home 
distances in hallway.)     AROM: Within functional limits  Strength: Within functional limits (4/5 BUE grossly)  Coordination: Within functional limits (R handed)  Tone: Normal  Sensation: Impaired (hand numbness)    ADL  Feeding: Independent  Grooming: Independent  UE Bathing: Independent  LE Bathing: Independent  UE Dressing: Independent  LE Dressing: Independent  LE Dressing Skilled Clinical Factors: Pt demo functioanl reach to manage socks  Toileting: Independent  Additional Comments: Scores based on clinical reasoning unless otherwise stated. Pt observed to be fully dressed and reporting completing IND this AM              Vision  Vision: Impaired  Vision Exceptions: Wears glasses for reading (cataract surgery)  Hearing  Hearing: Within functional limits    Cognition  Overall Cognitive Status: WFL  Orientation  Overall Orientation Status: Within Functional Limits  Orientation Level: Oriented X4                  Education Given To: Patient  Education Provided: Role of Therapy;Equipment;Plan of Care  Education Method: Verbal  Barriers to Learning: None  Education Outcome: Verbalized understanding       AM-PAC - ADL  AM-PAC Daily Activity - Inpatient   How much help is needed for putting on and taking off regular lower body clothing?: None  How much help is needed for bathing (which includes washing, rinsing, drying)?: None  How much help is needed for toileting (which includes using toilet, bedpan, or urinal)?: None  How much help is needed for putting on and taking off regular upper body clothing?: None  How much help is needed for taking care of personal grooming?: None  How much help for eating meals?: None  AM-PAC Inpatient Daily Activity Raw Score: 24  AM-PAC Inpatient ADL T-Scale Score : 57.54  ADL Inpatient CMS 0-100% Score: 0  ADL Inpatient CMS G-Code Modifier : CH      Goals  Short Term Goals  Time Frame for Short Term Goals: No acute OT goals identified       Therapy Time   Individual Concurrent Group 
    ABG:No results found for: \"POCPH\", \"PHART\", \"PH\", \"POCPCO2\", \"JNB6SEW\", \"PCO2\", \"POCPO2\", \"PO2ART\", \"PO2\", \"POCHCO3\", \"QNH6BQO\", \"HCO3\", \"NBEA\", \"PBEA\", \"BEART\", \"BE\", \"THGBART\", \"THB\", \"CAX4TMJ\", \"DMUB3NVX\", \"O0BMKEAW\", \"O2SAT\", \"FIO2\"  Lab Results   Component Value Date/Time    SPECIAL NOT REPORTED 08/16/2012 11:15 AM     Lab Results   Component Value Date/Time    CULTURE (A) 08/16/2012 11:15 AM     VIRIDANS STREPTOCOCCUS GROUP 2 COLONY TYPES 50,000 COL/ML    CULTURE  08/16/2012 11:15 AM     [employer] [address] [address] [phone]       Radiology:  MRI LUMBAR SPINE WO CONTRAST    Result Date: 6/14/2024  Prior lumbar fusion from L3 through L5 for grade 1 spondylolisthesis of L4 on L5.  Retrolisthesis of T12 on L1 and L1 on L2 and L2 on L3.  Disc and osteophytes cause narrowing of the neural foramina throughout the lumbar region as discussed above.  No significant stenosis of the thecal sac in the lumbar region.     MRI THORACIC SPINE WO CONTRAST    Result Date: 6/14/2024  Degenerative changes of the discs throughout the thoracic spine with small disc and osteophytes causing narrowing of the neural foramina as discussed above.  There is a focal disc protrusion toward the right at T6-T7 causing moderate to severe narrowing of the right neural foramen as discussed above.     MRI CERVICAL SPINE WO CONTRAST    Result Date: 6/14/2024  Mild reversal the cervical curvature.  Mild anterior subluxation of C3 on C4. Disc and osteophytes result in narrowing of the neural foramina and stenosis of the thecal sac as discussed above.  Moderate to severe stenosis of the thecal sac at several levels.     CT ABDOMEN PELVIS WO CONTRAST Additional Contrast? None    Result Date: 6/13/2024  1. No acute abnormality of the abdomen or pelvis is identified. 2. No evidence of renal calculi or obstructive uropathy. 3. Colonic diverticulosis. 4. Additional chronic findings, as above.     CT CERVICAL SPINE WO

## 2024-07-02 ENCOUNTER — OFFICE VISIT (OUTPATIENT)
Age: 79
End: 2024-07-02
Payer: MEDICARE

## 2024-07-02 VITALS
WEIGHT: 178 LBS | HEART RATE: 61 BPM | BODY MASS INDEX: 35.95 KG/M2 | SYSTOLIC BLOOD PRESSURE: 138 MMHG | DIASTOLIC BLOOD PRESSURE: 78 MMHG

## 2024-07-02 DIAGNOSIS — R15.9 INCONTINENCE OF FECES, UNSPECIFIED FECAL INCONTINENCE TYPE: ICD-10-CM

## 2024-07-02 DIAGNOSIS — Z79.2 PROPHYLACTIC ANTIBIOTIC: ICD-10-CM

## 2024-07-02 DIAGNOSIS — R33.9 RETENTION OF URINE: ICD-10-CM

## 2024-07-02 DIAGNOSIS — M54.50 CHRONIC LOW BACK PAIN, UNSPECIFIED BACK PAIN LATERALITY, UNSPECIFIED WHETHER SCIATICA PRESENT: ICD-10-CM

## 2024-07-02 DIAGNOSIS — G89.29 CHRONIC LOW BACK PAIN, UNSPECIFIED BACK PAIN LATERALITY, UNSPECIFIED WHETHER SCIATICA PRESENT: ICD-10-CM

## 2024-07-02 DIAGNOSIS — N39.42 URINARY INCONTINENCE WITHOUT SENSORY AWARENESS: ICD-10-CM

## 2024-07-02 DIAGNOSIS — K62.89 RECTAL PAIN: Primary | ICD-10-CM

## 2024-07-02 LAB
BILIRUBIN, POC: NORMAL
BLOOD URINE, POC: NORMAL
CLARITY, POC: CLEAR
COLOR, POC: YELLOW
GLUCOSE URINE, POC: NORMAL
KETONES, POC: NORMAL
LEUKOCYTE EST, POC: NORMAL
NITRITE, POC: NORMAL
PH, POC: 5
POST VOID RESIDUAL (PVR): 29 ML
PROTEIN, POC: NORMAL
SPECIFIC GRAVITY, POC: 1.01
UROBILINOGEN, POC: NORMAL

## 2024-07-02 PROCEDURE — 1123F ACP DISCUSS/DSCN MKR DOCD: CPT | Performed by: OBSTETRICS & GYNECOLOGY

## 2024-07-02 PROCEDURE — 51798 US URINE CAPACITY MEASURE: CPT | Performed by: OBSTETRICS & GYNECOLOGY

## 2024-07-02 PROCEDURE — 99459 PELVIC EXAMINATION: CPT | Performed by: OBSTETRICS & GYNECOLOGY

## 2024-07-02 PROCEDURE — 99214 OFFICE O/P EST MOD 30 MIN: CPT | Performed by: OBSTETRICS & GYNECOLOGY

## 2024-07-02 PROCEDURE — 81003 URINALYSIS AUTO W/O SCOPE: CPT | Performed by: OBSTETRICS & GYNECOLOGY

## 2024-07-02 RX ORDER — AMOXICILLIN 500 MG/1
1000 CAPSULE ORAL DAILY
Qty: 2 CAPSULE | Refills: 0 | Status: SHIPPED | OUTPATIENT
Start: 2024-07-02 | End: 2024-07-03

## 2024-07-02 RX ORDER — MIRABEGRON 25 MG/1
TABLET, FILM COATED, EXTENDED RELEASE ORAL
COMMUNITY
Start: 2024-07-02

## 2024-07-02 RX ORDER — TROSPIUM CHLORIDE 20 MG/1
20 TABLET, FILM COATED ORAL 2 TIMES DAILY
Qty: 60 TABLET | Refills: 3 | Status: SHIPPED | OUTPATIENT
Start: 2024-07-02

## 2024-07-02 NOTE — PROGRESS NOTES
[facility], [facility] UROGYNECOLOGY AND PELVIC REHABILITATION   [address] [place] [address]  [address] [place] [address]  Dept: [phone]  Date: 7/2/2024  Patient Name: Elicia Antony    VISIT - FOLLOW UP VISIT     CC: had concerns including Follow-up ( worsening incontinence, rectal pain/).    Chaperone present: Resident    HPI: Patient does continue to have chronic rectal pain.  She just switched to a morphine pump and has noticed some improvement.  I do not have anything else to offer her in regards to rectal pain in the absence of urogynecologic findings.  Past pudendal blocks have been unhelpful.  She may be faced with the fact that she may need an AP resection and permanent colostomy.  She does not wish to pursue that at this time.  I have had 1 other patient with similar symptoms have the surgery through [facility] who symptoms resolved and she is now pain free.    Recently the patient was admitted to [facility] for a neurological workup in relation to possible neurogenic bladder due to centralize neurologic disease a complete workup was negative.  The patient does have insensate large caliber urge loss going through multiple thick diapers per day and voiding hourly.  She will get up once to twice at nighttime.  She denies UTIs blood in the urine or kidney stones.    Overall state of well-being, dietary and nutritional habits, exercise routines of at least 30 minutes 3 times a week, bowel and bladder function, smoking history, HRT history, sexual activity and partner/s, dyspareunia, and immunizations all revisited if necessary by chart review or direct questioning.    Topics of Prolapse, Pain, Pressure were revisited including type, period of onset, level of severity, quality and quantity, associations, trends, exacerbators, alleviators, bleeding, prolapse to reduce, splinting, and prior treatment / surgery.    Topics of Urinary Leakage were revisited including 
[facility], [facility] UROGYNECOLOGY AND PELVIC REHABILITATION   [address] [place] [address]  Dept: [phone]  Date: 6/30/2024  Patient Name: Elicia Antony    VISIT - FOLLOW UP VISIT     CC: had no chief complaint listed for this encounter.    Chaperone present: None Required    HPI: Patient is here for discussion of  urinary incontinence and vaginal pain. Last time that she was seen in our office was in October of 2022. She was previously put on Imvexxy for vaginal atrophy. Reviewing of hospital records indicate that she was recently admitted to the hospital; has chronic back pain; follows with Dr. Davis. She was reporting urinary and fecal incontinence. Neurosurgery was consulted, surgical intervention was not recommended at that time.     Overall state of well-being, dietary and nutritional habits, exercise routines of at least 30 minutes 3 times a week, bowel and bladder function, smoking history, HRT history, sexual activity and partner/s, dyspareunia, and immunizations all revisited if necessary by chart review or direct questioning.    Topics of Prolapse, Pain, Pressure were revisited including type, period of onset, level of severity, quality and quantity, associations, trends, exacerbators, alleviators, bleeding, prolapse to reduce, splinting, and prior treatment / surgery.    Topics of Urinary Leakage were revisited including type, period of onset, level of severity, quality and quantity, associations, trends, exacerbators, alleviators, urgency, frequency, nocturia ,urge incontinence / stress incontinence triggers, hesitancy, obstruction with need to catheterize, frequent UTI\"s >3 in a year diagnosed by culture, hematuria (gross or microscopic), urinary stones, and prior treatment / surgery.    Topics of Fecal Incontinence were revisited including type, period of onset, level of severity, quality and quantity, associations, 
note construction, Extensive counseling, and Covering additional health topics on top of those listed in the chief complaint    Point of care: The supervising physician was present & available for assistance during the critical portions of the visit & procedure        Electronically signed by Shlomo Henry DO on 7/2/2024 at 11:24 AM    EMR / Voice Dictation Disclaimer: - This note is created with the assistance of a speech recognition program.  While intending to generate a timely document that accurately reflects the content of the visit, there is no guarantee every grammatical, syntax, or spelling error has been or will be identified or corrected.  Additionally, system limitations of the EMR and voice recognition software beyond the control of the practitioner may cause unintentional errors or omissions not identified or corrected at the time of record finalization and signature.

## 2024-07-08 RX ORDER — GABAPENTIN 300 MG/1
CAPSULE ORAL
Qty: 300 CAPSULE | Refills: 2 | OUTPATIENT
Start: 2024-07-08

## 2024-09-03 RX ORDER — GABAPENTIN 300 MG/1
CAPSULE ORAL
Qty: 90 CAPSULE | Refills: 2 | Status: SHIPPED | OUTPATIENT
Start: 2024-09-03 | End: 2024-12-03

## 2024-11-04 RX ORDER — GABAPENTIN 300 MG/1
CAPSULE ORAL
Qty: 270 CAPSULE | Refills: 3 | Status: SHIPPED | OUTPATIENT
Start: 2024-11-04 | End: 2025-05-04

## 2024-11-04 NOTE — TELEPHONE ENCOUNTER
Pharmacy requesting refill of gabapentin 300 mg.      Medication active on med list yes      Date of last Rx: 9/3/2024 with 2 refills          verified by SB, ROMEROA      Date of last appointment 4/30/2024    Next Visit Date:  11/12/2024